# Patient Record
Sex: FEMALE | Race: ASIAN | NOT HISPANIC OR LATINO | URBAN - METROPOLITAN AREA
[De-identification: names, ages, dates, MRNs, and addresses within clinical notes are randomized per-mention and may not be internally consistent; named-entity substitution may affect disease eponyms.]

---

## 2020-12-03 ENCOUNTER — EMERGENCY (EMERGENCY)
Facility: HOSPITAL | Age: 50
LOS: 1 days | Discharge: HOME | End: 2020-12-03
Attending: STUDENT IN AN ORGANIZED HEALTH CARE EDUCATION/TRAINING PROGRAM | Admitting: STUDENT IN AN ORGANIZED HEALTH CARE EDUCATION/TRAINING PROGRAM
Payer: MEDICAID

## 2020-12-03 VITALS
RESPIRATION RATE: 16 BRPM | HEART RATE: 93 BPM | OXYGEN SATURATION: 100 % | TEMPERATURE: 99 F | DIASTOLIC BLOOD PRESSURE: 107 MMHG | SYSTOLIC BLOOD PRESSURE: 156 MMHG

## 2020-12-03 VITALS
DIASTOLIC BLOOD PRESSURE: 79 MMHG | OXYGEN SATURATION: 100 % | RESPIRATION RATE: 16 BRPM | HEART RATE: 71 BPM | SYSTOLIC BLOOD PRESSURE: 127 MMHG | TEMPERATURE: 98 F

## 2020-12-03 LAB
ALBUMIN SERPL ELPH-MCNC: 4.2 G/DL — SIGNIFICANT CHANGE UP (ref 3.3–5)
ALP SERPL-CCNC: 84 U/L — SIGNIFICANT CHANGE UP (ref 40–120)
ALT FLD-CCNC: 35 U/L — HIGH (ref 4–33)
ANION GAP SERPL CALC-SCNC: 10 MMO/L — SIGNIFICANT CHANGE UP (ref 7–14)
APTT BLD: 34.6 SEC — SIGNIFICANT CHANGE UP (ref 27–36.3)
AST SERPL-CCNC: 34 U/L — HIGH (ref 4–32)
BASOPHILS # BLD AUTO: 0.05 K/UL — SIGNIFICANT CHANGE UP (ref 0–0.2)
BASOPHILS NFR BLD AUTO: 0.5 % — SIGNIFICANT CHANGE UP (ref 0–2)
BILIRUB SERPL-MCNC: < 0.2 MG/DL — LOW (ref 0.2–1.2)
BUN SERPL-MCNC: 9 MG/DL — SIGNIFICANT CHANGE UP (ref 7–23)
CALCIUM SERPL-MCNC: 9.2 MG/DL — SIGNIFICANT CHANGE UP (ref 8.4–10.5)
CHLORIDE SERPL-SCNC: 102 MMOL/L — SIGNIFICANT CHANGE UP (ref 98–107)
CO2 SERPL-SCNC: 26 MMOL/L — SIGNIFICANT CHANGE UP (ref 22–31)
CREAT SERPL-MCNC: 0.6 MG/DL — SIGNIFICANT CHANGE UP (ref 0.5–1.3)
EOSINOPHIL # BLD AUTO: 0.29 K/UL — SIGNIFICANT CHANGE UP (ref 0–0.5)
EOSINOPHIL NFR BLD AUTO: 3.1 % — SIGNIFICANT CHANGE UP (ref 0–6)
GLUCOSE SERPL-MCNC: 107 MG/DL — HIGH (ref 70–99)
HCG SERPL-ACNC: < 5 MIU/ML — SIGNIFICANT CHANGE UP
HCT VFR BLD CALC: 37.8 % — SIGNIFICANT CHANGE UP (ref 34.5–45)
HGB BLD-MCNC: 11.9 G/DL — SIGNIFICANT CHANGE UP (ref 11.5–15.5)
IMM GRANULOCYTES NFR BLD AUTO: 0.4 % — SIGNIFICANT CHANGE UP (ref 0–1.5)
INR BLD: 0.95 — SIGNIFICANT CHANGE UP (ref 0.88–1.16)
LYMPHOCYTES # BLD AUTO: 3.92 K/UL — HIGH (ref 1–3.3)
LYMPHOCYTES # BLD AUTO: 41.3 % — SIGNIFICANT CHANGE UP (ref 13–44)
MCHC RBC-ENTMCNC: 24.9 PG — LOW (ref 27–34)
MCHC RBC-ENTMCNC: 31.5 % — LOW (ref 32–36)
MCV RBC AUTO: 79.2 FL — LOW (ref 80–100)
MONOCYTES # BLD AUTO: 0.88 K/UL — SIGNIFICANT CHANGE UP (ref 0–0.9)
MONOCYTES NFR BLD AUTO: 9.3 % — SIGNIFICANT CHANGE UP (ref 2–14)
NEUTROPHILS # BLD AUTO: 4.32 K/UL — SIGNIFICANT CHANGE UP (ref 1.8–7.4)
NEUTROPHILS NFR BLD AUTO: 45.4 % — SIGNIFICANT CHANGE UP (ref 43–77)
NRBC # FLD: 0 K/UL — SIGNIFICANT CHANGE UP (ref 0–0)
NT-PROBNP SERPL-SCNC: 19.55 PG/ML — SIGNIFICANT CHANGE UP
PLATELET # BLD AUTO: 284 K/UL — SIGNIFICANT CHANGE UP (ref 150–400)
PMV BLD: 11.3 FL — SIGNIFICANT CHANGE UP (ref 7–13)
POTASSIUM SERPL-MCNC: 3.4 MMOL/L — LOW (ref 3.5–5.3)
POTASSIUM SERPL-SCNC: 3.4 MMOL/L — LOW (ref 3.5–5.3)
PROT SERPL-MCNC: 8.3 G/DL — SIGNIFICANT CHANGE UP (ref 6–8.3)
PROTHROM AB SERPL-ACNC: 10.9 SEC — SIGNIFICANT CHANGE UP (ref 10.6–13.6)
RBC # BLD: 4.77 M/UL — SIGNIFICANT CHANGE UP (ref 3.8–5.2)
RBC # FLD: 15.2 % — HIGH (ref 10.3–14.5)
SODIUM SERPL-SCNC: 138 MMOL/L — SIGNIFICANT CHANGE UP (ref 135–145)
TROPONIN T, HIGH SENSITIVITY: < 6 NG/L — SIGNIFICANT CHANGE UP (ref ?–14)
WBC # BLD: 9.5 K/UL — SIGNIFICANT CHANGE UP (ref 3.8–10.5)
WBC # FLD AUTO: 9.5 K/UL — SIGNIFICANT CHANGE UP (ref 3.8–10.5)

## 2020-12-03 PROCEDURE — 71046 X-RAY EXAM CHEST 2 VIEWS: CPT | Mod: 26

## 2020-12-03 PROCEDURE — 93010 ELECTROCARDIOGRAM REPORT: CPT

## 2020-12-03 PROCEDURE — 99285 EMERGENCY DEPT VISIT HI MDM: CPT | Mod: 25

## 2020-12-03 PROCEDURE — 70450 CT HEAD/BRAIN W/O DYE: CPT | Mod: 26

## 2020-12-03 PROCEDURE — 71260 CT THORAX DX C+: CPT | Mod: 26

## 2020-12-03 RX ORDER — ACETAMINOPHEN 500 MG
650 TABLET ORAL ONCE
Refills: 0 | Status: COMPLETED | OUTPATIENT
Start: 2020-12-03 | End: 2020-12-03

## 2020-12-03 RX ADMIN — Medication 650 MILLIGRAM(S): at 18:09

## 2020-12-03 NOTE — ED PROVIDER NOTE - NSFOLLOWUPINSTRUCTIONS_ED_ALL_ED_FT
1) Please Follow up with PMD/Clinic within 2-3 days     2) Please take the antibiotic Zithromax as ordered for fevers     3) Please take Tylenol 650mg every 4-6 hours as needed for pain.    4) Please return to  Emergency Department for any new or worsening symptoms.

## 2020-12-03 NOTE — ED ADULT NURSE NOTE - OBJECTIVE STATEMENT
Pt primarily Lao speaking.  services used [employer] [id number]. Pt presents to the ED a&ox4 and ambulatory c/o of right and left breast pain and nipple discharge x1 month, worse in the last 2 days. Pt states pain radiates to the back and neck. Pt endorsing dizziness and nausea with no vomiting. Pt endorsing having a fever earlier this week , pt currently afebrile. Pt denying cough, SOB, vomiting, diarrhea, dysuria, hematuria, recent travel, sick contacts. Pt presents hypertensive with no HTN hx. Md Garner aware of BP. Pt NSR on the monitor, breathing even and unlabored, sao2 100% on RA. 20g placed to the right AC, line clear and patent, labs drawn and sent. Pt urine hcg negative. Skin clean dry and intact. Awaiting further orders. Will continue to monitor.

## 2020-12-03 NOTE — ED PROVIDER NOTE - PATIENT PORTAL LINK FT
You can access the FollowMyHealth Patient Portal offered by [facility] by registering at the following website: [email or web address]. By joining [employer]’s FollowMyHealth portal, you will also be able to view your health information using other applications (apps) compatible with our system.

## 2020-12-03 NOTE — ED PROVIDER NOTE - PHYSICAL EXAMINATION
CONSTITUTIONAL: NAD, awake, alert  HEAD: Normocephalic; atraumatic  EYES: EOMI, no nystagmus, temporal fields intact   ENMT: External appears normal, MMM  CARD: Normal Sl, S2; no audible murmurs  BREAST: RN at bedside, no discharge, bilateral tenderness over nipple, no erythema   RESP: normal wob, lungs ctab  ABD: soft, non-distended; non-tender  MSK: no edema, normal ROM in all four extremities  SKIN: Warm, dry, no rashes  NEURO: aaox3, moving all extremities spontaneously

## 2020-12-03 NOTE — ED PROVIDER NOTE - NS ED ROS FT
General: + fever  HENT: denies nasal congestion, rhinorrhea  Eyes: denies visual changes, blurred vision  CV: denies chest pain, palpitations  Breast: + bilateral breast pain, + discharge   Resp: denies difficulty breathing, cough  Abdominal: denies nausea, vomiting, abdominal pain  MSK: denies muscle aches, leg swelling  Neuro: + headaches, denies numbness, tingling  Skin: denies rashes, bruises

## 2020-12-03 NOTE — ED PROVIDER NOTE - OBJECTIVE STATEMENT
50F w/ h/o pre diabetes, no meds, LMP 2 years ago, presents w/ 1 month of bilateral breast pain, headache, and intermittent fevers. States she does not have any chest pain. Pain is non exertional. States she has noted mild discharge from both nipples

## 2020-12-03 NOTE — ED ADULT NURSE NOTE - CHIEF COMPLAINT QUOTE
C/o CP x1 month, Cp worse x2 days. Also c/o SOB dizziness and nausea today. Speaks Korean.  Aris ID [id number]

## 2020-12-03 NOTE — ED PROVIDER NOTE - CLINICAL SUMMARY MEDICAL DECISION MAKING FREE TEXT BOX
50F w/ bilateral breast pain, + TTP, no erythema, low likelihood of bilateral abscesses but will check ct, ct head to check for large mass, likely needs MRI for prolactinoma, patient neuro intact otherwise, no chest pain or exertional component, low suspicion acs

## 2020-12-03 NOTE — ED ADULT TRIAGE NOTE - CHIEF COMPLAINT QUOTE
C/o CP x1 month, Cp worse x2 days. Also c/o SOB dizziness and nausea today. Speaks Frisian.  Aris ID [id number]

## 2020-12-03 NOTE — ED PROVIDER NOTE - ATTENDING CONTRIBUTION TO CARE
50F w/ PMH DM, HLD p/w chest pain and bilateral breast pain. pt states that she has been having pain in bilateral breast for the last 4-5 weeks. She reports its a burning pain across her chest w/o radiation. She states she has also had some mild discharge from both breasts and that her breasts are , + TTP, no erythema,. She states she had low grade fevers. LMP was 2 years ago. She has also been taking tylenol for the pain pain is currently 8/10   denies fever, chills, +chest pain, SOB, abdominal pain, diarrhea, dysuria, syncope, bleeding, new rash,weakness, numbness, blurred vision  +nipple discharge   ROS  otherwise negative as per HPI  Gen: Awake, Alert, WD, WN, NAD  Head:  NC/AT  Eyes:  PERRL, EOMI, Conjunctiva pink, lids normal, no scleral icterus  ENT: moist mucus membranes  Neck: supple, nontender, no meningismus, no JVD, trachea midline  Cardiac/CV:  S1 S2, RRR, no M/G/R  Chest: breath tenderness w/ ? fluctuance medial aspect of left breast, no nipple discharge, chaparone RN   Respiratory/Pulm:  CTAB, good air movement, normal resp effort, no wheezes/stridor/retractions/rales/rhonchi  Gastrointestinal/Abdomen:  Soft, nontender, nondistended, +BS, no rebound/guarding  Ext:  warm, well perfused, moving all extremities spontaneously, no peripheral edema, distal pulses intact  Skin: intact, no rash  Neuro:  AAOx3, sensation intact, motor 5/5 x 4 extremities, normal gait, speech clear  MDM as above

## 2020-12-04 ENCOUNTER — EMERGENCY (EMERGENCY)
Facility: HOSPITAL | Age: 50
LOS: 1 days | Discharge: HOME | End: 2020-12-04
Attending: EMERGENCY MEDICINE | Admitting: EMERGENCY MEDICINE
Payer: MEDICAID

## 2020-12-04 VITALS
DIASTOLIC BLOOD PRESSURE: 86 MMHG | TEMPERATURE: 99 F | RESPIRATION RATE: 20 BRPM | HEART RATE: 99 BPM | SYSTOLIC BLOOD PRESSURE: 132 MMHG | OXYGEN SATURATION: 100 %

## 2020-12-04 LAB — PROLACTIN SERPL-MCNC: 9.6 NG/ML — SIGNIFICANT CHANGE UP (ref 3.4–24.1)

## 2020-12-04 PROCEDURE — 93010 ELECTROCARDIOGRAM REPORT: CPT

## 2020-12-04 PROCEDURE — 71046 X-RAY EXAM CHEST 2 VIEWS: CPT | Mod: 26

## 2020-12-04 PROCEDURE — 99284 EMERGENCY DEPT VISIT MOD MDM: CPT | Mod: 25

## 2020-12-04 RX ORDER — DIPHENHYDRAMINE HCL 50 MG
25 CAPSULE ORAL ONCE
Refills: 0 | Status: COMPLETED | OUTPATIENT
Start: 2020-12-04 | End: 2020-12-04

## 2020-12-04 RX ORDER — AZITHROMYCIN 500 MG/1
1 TABLET, FILM COATED ORAL
Qty: 1 | Refills: 0
Start: 2020-12-04 | End: 2020-12-06

## 2020-12-04 RX ADMIN — Medication 25 MILLIGRAM(S): at 10:31

## 2020-12-04 RX ADMIN — Medication 50 MILLIGRAM(S): at 10:31

## 2020-12-04 NOTE — ED PROVIDER NOTE - NS ED ROS FT
CONST: no fevers, no chills  EYES: no pain, no vision changes  ENT: no sore throat, no ear pain, no change in hearing  CV: no chest pain, no leg swelling  RESP: no shortness of breath, no cough  ABD: no abdominal pain, no nausea, no vomiting, no diarrhea  : no dysuria, no flank pain, no hematuria  MSK: no back pain, no extremity pain  NEURO: no headache or additional neurologic complaints  HEME: no easy bleeding  SKIN:  + rash, + itching CONST: no fevers, no chills  EYES: no pain, no vision changes  ENT: no sore throat, no ear pain, no change in hearing  CV: no chest pain, no leg swelling  RESP: no shortness of breath, no cough  ABD: no abdominal pain, no nausea, no vomiting, no diarrhea  : no dysuria, no flank pain, no hematuria  MSK: no back pain, no extremity pain  NEURO: no headache or additional neurologic complaints  HEME: no easy bleeding  SKIN:  + rash, + itching  Breast- watery discharge

## 2020-12-04 NOTE — ED ADULT NURSE NOTE - CHIEF COMPLAINT QUOTE
[phone] # son on the phone states my mom has rash with itching since yesterday, she was seen in ER with chest pain and head ache and was given some meds and when she went home she started to itch with rash and body pain .son endorses to subjective fever , patient sent home with script for Zithromax which they never picked it from Pharmacy. son Eliu Vidal

## 2020-12-04 NOTE — ED PROVIDER NOTE - PHYSICAL EXAMINATION
GENERAL: Awake, alert, NAD  HEENT: NC/AT, moist mucous membranes, PERRL, EOMI  LUNGS: CTAB, no wheezes or crackles   BREAST: No discharge, mildly tender to palpation bilaterally.   CARDIAC: RRR, no m/r/g  ABDOMEN: Soft, normal BS, non tender, non distended, no rebound, no guarding  BACK: No midline spinal tenderness, no CVA tenderness  EXT: No edema, no calf tenderness, 2+ DP pulses bilaterally, no deformities.  NEURO: A&Ox3. Moving all extremities.  SKIN: Warm and dry. Excoriations across arms, abdomen, back. No hives.   PSYCH: Normal affect. GENERAL: Awake, alert, NAD  HEENT: NC/AT, moist mucous membranes, PERRL, EOMI  LUNGS: CTAB, no wheezes or crackles   BREAST: No discharge, mildly tender to palpation bilaterally.   CARDIAC: RRR, no m/r/g  ABDOMEN: Soft, normal BS, non tender, non distended, no rebound, no guarding  BACK: No midline spinal tenderness, no CVA tenderness  EXT: No edema, no calf tenderness, 2+ DP pulses bilaterally, no deformities.  NEURO: A&Ox3. Moving all extremities.  SKIN: Warm and dry. Excoriations across arms, abdomen, back.  hives.   PSYCH: Normal affect.

## 2020-12-04 NOTE — ED PROVIDER NOTE - PATIENT PORTAL LINK FT
You can access the FollowMyHealth Patient Portal offered by [facility] by registering at the following website: [email or web address]. By joining [employer]’s FollowMyHealth portal, you will also be able to view your health information using other applications (apps) compatible with our system.

## 2020-12-04 NOTE — ED PROVIDER NOTE - ATTENDING CONTRIBUTION TO CARE
I performed a face to face evaluation of this patient and performed a full history and physical examination on the patient.  I agree with the resident's history, physical examination, and plan of the patient.  Pt with breast discharge/pain, but no discharge on exam, no masses felt (chaperone with resident [place]), lungs cta, heart wnl abdomen soft nontender, ext wnl, neuro wnl skin with diffuse maculopapular blanchable rash c/w allergic reaction    Pt with rash likley secondary to the Azithomax, told not to take the medication and now is allergic to it, no evidence of infection, discharged on steroids and benadryl and followup with pmd

## 2020-12-04 NOTE — ED PROVIDER NOTE - CLINICAL SUMMARY MEDICAL DECISION MAKING FREE TEXT BOX
50 year old F with allergic reaction s/p azithromycin. Excoriations across body, no wheezing or SOB. Normal head and chest CT in ED yesterday. Will give benadryl, prednisone. Reassess. Follow up with PCP.

## 2020-12-04 NOTE — ED ADULT TRIAGE NOTE - CHIEF COMPLAINT QUOTE
son on the phone states my mom has rash with itching since yesterday, she was seen in ER with chest pain and head ache and was given some meds and when she went home she started to itch with rash and body pain .son endorses to subjective fever [phone] # son on the phone states my mom has rash with itching since yesterday, she was seen in ER with chest pain and head ache and was given some meds and when she went home she started to itch with rash and body pain .son endorses to subjective fever , patient sent home with script for Zithromax which they never picked it from Pharmacy. son Eliu Vidal

## 2020-12-04 NOTE — ED PROVIDER NOTE - NSFOLLOWUPINSTRUCTIONS_ED_ALL_ED_FT
You were evaluated in the emergency department for an allergic reaction. Your symptoms improved with medication.     Please follow up with your primary care doctor. Use caution when taking any new antibiotics.     Please return to the emergency department with any new or worsening symptoms, including:  -Severe itching and hives  -Shortness of breath  -Sensation of throat closing  -Chest pain  -Abdominal pain  -High fevers You were evaluated in the emergency department for an allergic reaction. Your symptoms improved with medication.     Please follow up with your primary care doctor. Use caution when taking any new antibiotics.     You can take Benadryl over the counter, one pill every 8 hours as needed for the next 2 days. A prescription for Prednisone has been sent to your pharmacy.     Please return to the emergency department with any new or worsening symptoms, including:  -Severe itching and hives  -Shortness of breath  -Sensation of throat closing  -Chest pain  -Abdominal pain  -High fevers

## 2020-12-04 NOTE — ED PROVIDER NOTE - OBJECTIVE STATEMENT
50 year old F with PMH prediabetes presenting with diffuse itching. Patient states she was seen in ED last night for bilateral breast pain and watery discharge x1 month. Was given azithromycin and discharged with prescription. Beginning 4am began to experience diffuse itching throughout her body. Called ED and was told it was probably a reaction to antibiotic, told to DC antibiotic. Took two "allergy pills" and used some lotion with no relief. States she had a similar allergic reaction 4 years ago but cannot recall to what. Otherwise no known allergies. Denies difficulty swallowing, shortness of breath, CP, fevers, N/V/D. Follows with PCP Dr. Mimi Prakash, whom she had seen previously for complaints of breast pain, treated with Tylenol and ibuprofen. No prior mammograms. History obtained from son and  #[id number].

## 2020-12-04 NOTE — ED PROVIDER NOTE - PROGRESS NOTE DETAILS
Kristin Sinha PGY-1:  Pt was re-evaluated at bedside, VSS, feeling better overall. Results were discussed with patient as well as return precautions and follow up plan with PCP and/or specialist. Time was taken to answer any questions that the patient had before providing them with discharge paperwork. Explained discharging without official XR read, patient and son agreeable. pt's physician Mimi Uptal . pt's physician Mimi Uptal .- tried to call but unable to reach, family knows to followup with him- left message

## 2021-08-22 ENCOUNTER — EMERGENCY (EMERGENCY)
Facility: HOSPITAL | Age: 51
LOS: 1 days | Discharge: HOME | End: 2021-08-22
Admitting: EMERGENCY MEDICINE
Payer: MEDICAID

## 2021-08-22 VITALS
TEMPERATURE: 98 F | SYSTOLIC BLOOD PRESSURE: 123 MMHG | DIASTOLIC BLOOD PRESSURE: 65 MMHG | RESPIRATION RATE: 16 BRPM | OXYGEN SATURATION: 100 % | HEART RATE: 81 BPM

## 2021-08-22 VITALS
SYSTOLIC BLOOD PRESSURE: 117 MMHG | TEMPERATURE: 98 F | RESPIRATION RATE: 16 BRPM | DIASTOLIC BLOOD PRESSURE: 68 MMHG | OXYGEN SATURATION: 99 % | HEART RATE: 86 BPM

## 2021-08-22 LAB
ALBUMIN SERPL ELPH-MCNC: 4 G/DL — SIGNIFICANT CHANGE UP (ref 3.3–5)
ALP SERPL-CCNC: 77 U/L — SIGNIFICANT CHANGE UP (ref 40–120)
ALT FLD-CCNC: 32 U/L — SIGNIFICANT CHANGE UP (ref 4–33)
ANION GAP SERPL CALC-SCNC: 15 MMOL/L — HIGH (ref 7–14)
AST SERPL-CCNC: 30 U/L — SIGNIFICANT CHANGE UP (ref 4–32)
BASOPHILS # BLD AUTO: 0.07 K/UL — SIGNIFICANT CHANGE UP (ref 0–0.2)
BASOPHILS NFR BLD AUTO: 0.9 % — SIGNIFICANT CHANGE UP (ref 0–2)
BILIRUB SERPL-MCNC: 0.2 MG/DL — SIGNIFICANT CHANGE UP (ref 0.2–1.2)
BUN SERPL-MCNC: 12 MG/DL — SIGNIFICANT CHANGE UP (ref 7–23)
CALCIUM SERPL-MCNC: 9.6 MG/DL — SIGNIFICANT CHANGE UP (ref 8.4–10.5)
CHLORIDE SERPL-SCNC: 102 MMOL/L — SIGNIFICANT CHANGE UP (ref 98–107)
CO2 SERPL-SCNC: 21 MMOL/L — LOW (ref 22–31)
CREAT SERPL-MCNC: 0.74 MG/DL — SIGNIFICANT CHANGE UP (ref 0.5–1.3)
EOSINOPHIL # BLD AUTO: 0.32 K/UL — SIGNIFICANT CHANGE UP (ref 0–0.5)
EOSINOPHIL NFR BLD AUTO: 3.9 % — SIGNIFICANT CHANGE UP (ref 0–6)
GLUCOSE SERPL-MCNC: 132 MG/DL — HIGH (ref 70–99)
HCT VFR BLD CALC: 38.9 % — SIGNIFICANT CHANGE UP (ref 34.5–45)
HGB BLD-MCNC: 12.4 G/DL — SIGNIFICANT CHANGE UP (ref 11.5–15.5)
IANC: 3.49 K/UL — SIGNIFICANT CHANGE UP (ref 1.5–8.5)
IMM GRANULOCYTES NFR BLD AUTO: 0.2 % — SIGNIFICANT CHANGE UP (ref 0–1.5)
LYMPHOCYTES # BLD AUTO: 3.66 K/UL — HIGH (ref 1–3.3)
LYMPHOCYTES # BLD AUTO: 44.6 % — HIGH (ref 13–44)
MCHC RBC-ENTMCNC: 24.7 PG — LOW (ref 27–34)
MCHC RBC-ENTMCNC: 31.9 GM/DL — LOW (ref 32–36)
MCV RBC AUTO: 77.5 FL — LOW (ref 80–100)
MONOCYTES # BLD AUTO: 0.64 K/UL — SIGNIFICANT CHANGE UP (ref 0–0.9)
MONOCYTES NFR BLD AUTO: 7.8 % — SIGNIFICANT CHANGE UP (ref 2–14)
NEUTROPHILS # BLD AUTO: 3.49 K/UL — SIGNIFICANT CHANGE UP (ref 1.8–7.4)
NEUTROPHILS NFR BLD AUTO: 42.6 % — LOW (ref 43–77)
NRBC # BLD: 0 /100 WBCS — SIGNIFICANT CHANGE UP
NRBC # FLD: 0 K/UL — SIGNIFICANT CHANGE UP
PLATELET # BLD AUTO: 372 K/UL — SIGNIFICANT CHANGE UP (ref 150–400)
POTASSIUM SERPL-MCNC: 4 MMOL/L — SIGNIFICANT CHANGE UP (ref 3.5–5.3)
POTASSIUM SERPL-SCNC: 4 MMOL/L — SIGNIFICANT CHANGE UP (ref 3.5–5.3)
PROT SERPL-MCNC: 8.1 G/DL — SIGNIFICANT CHANGE UP (ref 6–8.3)
RBC # BLD: 5.02 M/UL — SIGNIFICANT CHANGE UP (ref 3.8–5.2)
RBC # FLD: 14.3 % — SIGNIFICANT CHANGE UP (ref 10.3–14.5)
SODIUM SERPL-SCNC: 138 MMOL/L — SIGNIFICANT CHANGE UP (ref 135–145)
WBC # BLD: 8.2 K/UL — SIGNIFICANT CHANGE UP (ref 3.8–10.5)
WBC # FLD AUTO: 8.2 K/UL — SIGNIFICANT CHANGE UP (ref 3.8–10.5)

## 2021-08-22 PROCEDURE — 99284 EMERGENCY DEPT VISIT MOD MDM: CPT

## 2021-08-22 RX ORDER — KETOROLAC TROMETHAMINE 30 MG/ML
30 SYRINGE (ML) INJECTION ONCE
Refills: 0 | Status: DISCONTINUED | OUTPATIENT
Start: 2021-08-22 | End: 2021-08-22

## 2021-08-22 RX ADMIN — Medication 30 MILLIGRAM(S): at 11:37

## 2021-08-22 RX ADMIN — Medication 30 MILLIGRAM(S): at 12:22

## 2021-08-22 NOTE — ED PROVIDER NOTE - EXTREMITY EXAM
no swelling bilaterally, ambulating with steady gait, pulses palpable, good capillary refill./no deformity, pain or tenderness, no restriction of movement

## 2021-08-22 NOTE — ED PROVIDER NOTE - PATIENT PORTAL LINK FT
You can access the FollowMyHealth Patient Portal offered by [facility] by registering at the following website: [email or web address]. By joining [employer]s FollowMyHealth portal, you will also be able to view your health information using other applications (apps) compatible with our system.

## 2021-08-22 NOTE — ED PROVIDER NOTE - NSFOLLOWUPINSTRUCTIONS_ED_ALL_ED_FT
Rest, drink plenty of fluids.  Advance activity as tolerated.  Continue all previously prescribed medications as directed.  Follow up with your primary care physician in 48-72 hours- bring copies of your results.  Return to the ER for worsening or persistent symptoms, and/or ANY NEW OR CONCERNING SYMPTOMS. If you have issues obtaining follow up, please call: [phone] to obtain a doctor or specialist who takes your insurance in your area.  You may call [phone] to make an appointment with the internal medicine clinic.

## 2021-08-22 NOTE — ED PROVIDER NOTE - CLINICAL SUMMARY MEDICAL DECISION MAKING FREE TEXT BOX
this is a 51 y.o with a PMHx of DM not on any medication, presented to the ED complaining of having bilateral hand and foot pain for the past several months, Hand and foot pain-labs r/o electrolyte abnormality,

## 2021-08-22 NOTE — ED ADULT TRIAGE NOTE - CHIEF COMPLAINT QUOTE
translater  [id number]  Babu/ pt c/o pain to rt hand and both lower extremity/ describes as burning feeling for past month  deneis DM or Htn /  pt is allergic to Iv contrast

## 2022-02-21 ENCOUNTER — EMERGENCY (EMERGENCY)
Facility: HOSPITAL | Age: 52
LOS: 1 days | Discharge: HOME | End: 2022-02-21
Attending: EMERGENCY MEDICINE | Admitting: EMERGENCY MEDICINE
Payer: MEDICAID

## 2022-02-21 VITALS
DIASTOLIC BLOOD PRESSURE: 101 MMHG | SYSTOLIC BLOOD PRESSURE: 157 MMHG | RESPIRATION RATE: 17 BRPM | OXYGEN SATURATION: 99 % | HEART RATE: 81 BPM | TEMPERATURE: 98 F

## 2022-02-21 VITALS
SYSTOLIC BLOOD PRESSURE: 158 MMHG | RESPIRATION RATE: 18 BRPM | OXYGEN SATURATION: 100 % | DIASTOLIC BLOOD PRESSURE: 86 MMHG | HEART RATE: 90 BPM

## 2022-02-21 DIAGNOSIS — Z90.89 ACQUIRED ABSENCE OF OTHER ORGANS: Chronic | ICD-10-CM

## 2022-02-21 PROCEDURE — 93010 ELECTROCARDIOGRAM REPORT: CPT

## 2022-02-21 PROCEDURE — 99284 EMERGENCY DEPT VISIT MOD MDM: CPT | Mod: 25

## 2022-02-21 RX ORDER — LIDOCAINE 4 G/100G
1 CREAM TOPICAL ONCE
Refills: 0 | Status: COMPLETED | OUTPATIENT
Start: 2022-02-21 | End: 2022-02-21

## 2022-02-21 RX ORDER — ACETAMINOPHEN 500 MG
650 TABLET ORAL ONCE
Refills: 0 | Status: COMPLETED | OUTPATIENT
Start: 2022-02-21 | End: 2022-02-21

## 2022-02-21 RX ADMIN — Medication 650 MILLIGRAM(S): at 15:17

## 2022-02-21 RX ADMIN — LIDOCAINE 1 PATCH: 4 CREAM TOPICAL at 15:18

## 2022-02-21 NOTE — ED PROVIDER NOTE - ATTENDING CONTRIBUTION TO CARE
HPI: 52 y/o female with a hx of DM, HTN(not on Meds), Depression presents to the ER c/o 1+ year of left sided neck/shoulder pain worse the past few months, pt reports intermittent tingling/numbness.  Pt has seen and nerve specialist and Orthopedics for the same complaint.  Pt reports relief at home with Motrin.  Pt denies chest pain, shortness of breath, weakness, nausea, vomiting, abdominal pain, fevers, chills, injury.   EXAM: NAD, speaking full sentences, FROM at neck but with pain with rotation, n\ttp bilateral trapezius R>L. No stepoffs or midline tenderness to palpation C/T/L spine, gait steady and stable, no need for assistance.   MDM: pt with multiple medical problems that has had pain in neck/back x 1 year, here for pain meds since pain worsening in last few months, likely cervical radiculopathy. Explained that this is likely MSK pain and not stroke or other acute emergent pathology. Pt and son aware. Advised to f/u with PMD for PT eval and recs vs stretching in a safe and comfortable environment at home to relief long term neck pain/muscle strain but in meantime can take OTC pain meds for relief or use Icy hot or other menthol ointments.

## 2022-02-21 NOTE — ED PROVIDER NOTE - NSFOLLOWUPINSTRUCTIONS_ED_ALL_ED_FT
Follow up with your Doctor in 1-2 days.    Follow up with Orthopedics in 1-2 days see attached list.  Heat to back.    Take Tylenol 650mg orally every 6 hours as needed for pain.  Return to the ER for any persistent/worsening or new symptoms, weakness, numbness, tingling, chest pain, shortness of breath, or any concerning symptoms. none

## 2022-02-21 NOTE — ED PROVIDER NOTE - PHYSICAL EXAMINATION
+TTP left side paraspinal cervical region, FROM, strength 5/5, sensation equal and intact, mild TTP at anterior aspect of shoulder with FROM. 2+ pulses.

## 2022-02-21 NOTE — ED ADULT TRIAGE NOTE - CHIEF COMPLAINT QUOTE
Pt c/o hypertension, left sided neck and head pain starting 3 days ago. Pt not currently prescribed blood pressure medication

## 2022-02-21 NOTE — ED PROVIDER NOTE - PATIENT PORTAL LINK FT
You can access the FollowMyHealth Patient Portal offered by [facility] by registering at the following website: [email or web address]. By joining [employer]’s FollowMyHealth portal, you will also be able to view your health information using other applications (apps) compatible with our system.

## 2022-02-21 NOTE — ED PROVIDER NOTE - CLINICAL SUMMARY MEDICAL DECISION MAKING FREE TEXT BOX
52 y/o female with a hx of DM, HTN(not on Meds), Depression presents to the ER c/o 1+ year of left sided neck/shoulder pain worse the past few months, pt reports intermittent tingling/numbness.  Pt has seen and nerve specialist and Orthopedics for the same complaint.  Pt is well appearing, NAD, normal neuro exam, pain control, follow up PMD/Orthopedics.

## 2022-02-21 NOTE — ED PROVIDER NOTE - PROGRESS NOTE DETAILS
ELAYNE Guillen: pt feels better ambulating without difficulty.  Discharge reviewed and discussed with patient.  All questions answered conducted via  ID# [id number].  Pt's son at bedside.

## 2022-02-21 NOTE — ED PROVIDER NOTE - OBJECTIVE STATEMENT
50 y/o female with a hx of DM, HTN(not on Meds), Depression presents to the ER c/o 1+ year of left sided neck/shoulder pain worse the past few months, pt reports intermittent tingling/numbness.  Pt has seen and nerve specialist and Orthopedics for the same complaint.  Pt reports relief at home with Motrin.  Pt denies chest pain, shortness of breath, weakness, nausea, vomiting, abdominal pain, fevers, chills, injury.  H&P obtained via  ID# [id number].

## 2022-02-21 NOTE — ED ADULT NURSE NOTE - OBJECTIVE STATEMENT
Break RN note- patient arrives to the ED for left arm and neck pain for the past 6-12 months. Patient reports it is worse today. A&Ox4. Son at bedside. Patient reports she has seen her PCP and a neurologist for this issue before. Patient unable to clearly communicate what the outcome was. Patient as full range of motion. Denies any headache, dizziness, SOB, or chest pain. Patient medicated as ordered. Safety maintained. Patient stable upon exiting the room.

## 2023-01-13 ENCOUNTER — EMERGENCY (EMERGENCY)
Facility: HOSPITAL | Age: 53
LOS: 1 days | Discharge: HOME | End: 2023-01-13
Attending: EMERGENCY MEDICINE | Admitting: EMERGENCY MEDICINE
Payer: MEDICAID

## 2023-01-13 VITALS
SYSTOLIC BLOOD PRESSURE: 150 MMHG | TEMPERATURE: 98 F | RESPIRATION RATE: 16 BRPM | DIASTOLIC BLOOD PRESSURE: 82 MMHG | OXYGEN SATURATION: 100 % | HEART RATE: 98 BPM

## 2023-01-13 VITALS
TEMPERATURE: 98 F | RESPIRATION RATE: 16 BRPM | DIASTOLIC BLOOD PRESSURE: 88 MMHG | OXYGEN SATURATION: 100 % | HEART RATE: 98 BPM | SYSTOLIC BLOOD PRESSURE: 147 MMHG

## 2023-01-13 DIAGNOSIS — Z90.89 ACQUIRED ABSENCE OF OTHER ORGANS: Chronic | ICD-10-CM

## 2023-01-13 PROBLEM — I10 ESSENTIAL (PRIMARY) HYPERTENSION: Chronic | Status: ACTIVE | Noted: 2022-02-21

## 2023-01-13 PROBLEM — E11.9 TYPE 2 DIABETES MELLITUS WITHOUT COMPLICATIONS: Chronic | Status: ACTIVE | Noted: 2022-02-21

## 2023-01-13 LAB
ALBUMIN SERPL ELPH-MCNC: 3.8 G/DL — SIGNIFICANT CHANGE UP (ref 3.3–5)
ALP SERPL-CCNC: 91 U/L — SIGNIFICANT CHANGE UP (ref 40–120)
ALT FLD-CCNC: 25 U/L — SIGNIFICANT CHANGE UP (ref 4–33)
ANION GAP SERPL CALC-SCNC: 11 MMOL/L — SIGNIFICANT CHANGE UP (ref 7–14)
AST SERPL-CCNC: 25 U/L — SIGNIFICANT CHANGE UP (ref 4–32)
B PERT DNA SPEC QL NAA+PROBE: SIGNIFICANT CHANGE UP
B PERT+PARAPERT DNA PNL SPEC NAA+PROBE: SIGNIFICANT CHANGE UP
BASOPHILS # BLD AUTO: 0.07 K/UL — SIGNIFICANT CHANGE UP (ref 0–0.2)
BASOPHILS NFR BLD AUTO: 0.9 % — SIGNIFICANT CHANGE UP (ref 0–2)
BILIRUB SERPL-MCNC: 0.2 MG/DL — SIGNIFICANT CHANGE UP (ref 0.2–1.2)
BORDETELLA PARAPERTUSSIS (RAPRVP): SIGNIFICANT CHANGE UP
BUN SERPL-MCNC: 12 MG/DL — SIGNIFICANT CHANGE UP (ref 7–23)
C PNEUM DNA SPEC QL NAA+PROBE: SIGNIFICANT CHANGE UP
CALCIUM SERPL-MCNC: 8.8 MG/DL — SIGNIFICANT CHANGE UP (ref 8.4–10.5)
CHLORIDE SERPL-SCNC: 103 MMOL/L — SIGNIFICANT CHANGE UP (ref 98–107)
CO2 SERPL-SCNC: 23 MMOL/L — SIGNIFICANT CHANGE UP (ref 22–31)
CREAT SERPL-MCNC: 0.6 MG/DL — SIGNIFICANT CHANGE UP (ref 0.5–1.3)
EGFR: 109 ML/MIN/1.73M2 — SIGNIFICANT CHANGE UP
EOSINOPHIL # BLD AUTO: 0.34 K/UL — SIGNIFICANT CHANGE UP (ref 0–0.5)
EOSINOPHIL NFR BLD AUTO: 4.2 % — SIGNIFICANT CHANGE UP (ref 0–6)
FLUAV SUBTYP SPEC NAA+PROBE: SIGNIFICANT CHANGE UP
FLUBV RNA SPEC QL NAA+PROBE: SIGNIFICANT CHANGE UP
GLUCOSE SERPL-MCNC: 163 MG/DL — HIGH (ref 70–99)
HADV DNA SPEC QL NAA+PROBE: SIGNIFICANT CHANGE UP
HCOV 229E RNA SPEC QL NAA+PROBE: SIGNIFICANT CHANGE UP
HCOV HKU1 RNA SPEC QL NAA+PROBE: SIGNIFICANT CHANGE UP
HCOV NL63 RNA SPEC QL NAA+PROBE: SIGNIFICANT CHANGE UP
HCOV OC43 RNA SPEC QL NAA+PROBE: SIGNIFICANT CHANGE UP
HCT VFR BLD CALC: 38.5 % — SIGNIFICANT CHANGE UP (ref 34.5–45)
HGB BLD-MCNC: 11.8 G/DL — SIGNIFICANT CHANGE UP (ref 11.5–15.5)
HMPV RNA SPEC QL NAA+PROBE: SIGNIFICANT CHANGE UP
HPIV1 RNA SPEC QL NAA+PROBE: SIGNIFICANT CHANGE UP
HPIV2 RNA SPEC QL NAA+PROBE: SIGNIFICANT CHANGE UP
HPIV3 RNA SPEC QL NAA+PROBE: SIGNIFICANT CHANGE UP
HPIV4 RNA SPEC QL NAA+PROBE: SIGNIFICANT CHANGE UP
IANC: 3.93 K/UL — SIGNIFICANT CHANGE UP (ref 1.8–7.4)
IMM GRANULOCYTES NFR BLD AUTO: 0.4 % — SIGNIFICANT CHANGE UP (ref 0–0.9)
LYMPHOCYTES # BLD AUTO: 3.09 K/UL — SIGNIFICANT CHANGE UP (ref 1–3.3)
LYMPHOCYTES # BLD AUTO: 38.5 % — SIGNIFICANT CHANGE UP (ref 13–44)
M PNEUMO DNA SPEC QL NAA+PROBE: SIGNIFICANT CHANGE UP
MCHC RBC-ENTMCNC: 23.3 PG — LOW (ref 27–34)
MCHC RBC-ENTMCNC: 30.6 GM/DL — LOW (ref 32–36)
MCV RBC AUTO: 76.1 FL — LOW (ref 80–100)
MONOCYTES # BLD AUTO: 0.56 K/UL — SIGNIFICANT CHANGE UP (ref 0–0.9)
MONOCYTES NFR BLD AUTO: 7 % — SIGNIFICANT CHANGE UP (ref 2–14)
NEUTROPHILS # BLD AUTO: 3.93 K/UL — SIGNIFICANT CHANGE UP (ref 1.8–7.4)
NEUTROPHILS NFR BLD AUTO: 49 % — SIGNIFICANT CHANGE UP (ref 43–77)
NRBC # BLD: 0 /100 WBCS — SIGNIFICANT CHANGE UP (ref 0–0)
NRBC # FLD: 0 K/UL — SIGNIFICANT CHANGE UP (ref 0–0)
PLATELET # BLD AUTO: 359 K/UL — SIGNIFICANT CHANGE UP (ref 150–400)
POTASSIUM SERPL-MCNC: 3.7 MMOL/L — SIGNIFICANT CHANGE UP (ref 3.5–5.3)
POTASSIUM SERPL-SCNC: 3.7 MMOL/L — SIGNIFICANT CHANGE UP (ref 3.5–5.3)
PROT SERPL-MCNC: 7.7 G/DL — SIGNIFICANT CHANGE UP (ref 6–8.3)
RAPID RVP RESULT: SIGNIFICANT CHANGE UP
RBC # BLD: 5.06 M/UL — SIGNIFICANT CHANGE UP (ref 3.8–5.2)
RBC # FLD: 16.2 % — HIGH (ref 10.3–14.5)
RSV RNA SPEC QL NAA+PROBE: SIGNIFICANT CHANGE UP
RV+EV RNA SPEC QL NAA+PROBE: SIGNIFICANT CHANGE UP
SARS-COV-2 RNA SPEC QL NAA+PROBE: SIGNIFICANT CHANGE UP
SODIUM SERPL-SCNC: 137 MMOL/L — SIGNIFICANT CHANGE UP (ref 135–145)
WBC # BLD: 8.02 K/UL — SIGNIFICANT CHANGE UP (ref 3.8–10.5)
WBC # FLD AUTO: 8.02 K/UL — SIGNIFICANT CHANGE UP (ref 3.8–10.5)

## 2023-01-13 PROCEDURE — 71046 X-RAY EXAM CHEST 2 VIEWS: CPT | Mod: 26

## 2023-01-13 PROCEDURE — 99284 EMERGENCY DEPT VISIT MOD MDM: CPT

## 2023-01-13 RX ORDER — METOCLOPRAMIDE HCL 10 MG
10 TABLET ORAL ONCE
Refills: 0 | Status: COMPLETED | OUTPATIENT
Start: 2023-01-13 | End: 2023-01-13

## 2023-01-13 RX ORDER — SODIUM CHLORIDE 9 MG/ML
1000 INJECTION INTRAMUSCULAR; INTRAVENOUS; SUBCUTANEOUS ONCE
Refills: 0 | Status: COMPLETED | OUTPATIENT
Start: 2023-01-13 | End: 2023-01-13

## 2023-01-13 RX ORDER — KETOROLAC TROMETHAMINE 30 MG/ML
15 SYRINGE (ML) INJECTION ONCE
Refills: 0 | Status: DISCONTINUED | OUTPATIENT
Start: 2023-01-13 | End: 2023-01-13

## 2023-01-13 RX ADMIN — Medication 10 MILLIGRAM(S): at 13:59

## 2023-01-13 RX ADMIN — Medication 15 MILLIGRAM(S): at 09:55

## 2023-01-13 RX ADMIN — SODIUM CHLORIDE 1000 MILLILITER(S): 9 INJECTION INTRAMUSCULAR; INTRAVENOUS; SUBCUTANEOUS at 09:56

## 2023-01-13 NOTE — ED PROVIDER NOTE - OBJECTIVE STATEMENT
52-year-old female complaining of 5-day history of illness.  Patient with generalized weakness, left forearm pain, headache, bilateral shoulder aches.  Denies sick contacts, denies fever/chills, positive occasional sweat.  States noted a small red "bug bite" on left forearm which was itching and swollen.  Patient noticed increased swelling to forearm and then swelling to the face.  Denies tooth pain, throat pain, dysphagia, congestion, phlegm.  Positive cough but states has mild chronic cough.  No shortness of breath.  Patient today with increased pain to both arms and head, used ibuprofen 600 mg with no relief.  No history of allergy, no history of trauma to extremity, no visualized insect, no discharge at site.  Had flu and COVID vaccination previously.

## 2023-01-13 NOTE — ED PROVIDER NOTE - PHYSICAL EXAMINATION
Constitutional: VS reviewed. Alert and orientedx3, fatigued appearing  HEENT: Atraumatic, EOMI  CV: RRR  Lungs: Clear and equal bilaterally, no wheezes, rales or crackles  Abdomen: Soft, nondistended, nontender  MSK: No deformities  Skin: Warm and dry. Small raised macule on left wrist with no surrounding erythema, swelling or rash.   Neuro: Appears nonfocal  Lymph: No pitting edema in extremities.

## 2023-01-13 NOTE — ED PROVIDER NOTE - ATTENDING CONTRIBUTION TO CARE
Seen and examined, 5-day history of illness, states onset was same day as noted bug bite to left forearm.  Now complaining of headache shoulder ache and generalized body aches and weakness, no relief with ibuprofen 600 mg.  No sick contacts.  No nausea/vomiting/diarrhea.  No urinary complaints.  Has mild chronic cough which is persistent this week.  Denies shortness of breath.  In ED complaining of bifrontal headache without neck pain and with pain to the left forearm in the area of "bite".  MMM, full ROM neck, no ant. LNs, no facial asymmetry, NT teeth, no trismus, NT spine, clear lungs, heart reg, abd soft, NT to palp, no julito/guarding, no CVAT, no edema, NT calves, L forearm w punctate pustule, no sig. change in arm size/girth c/w R forearm, full ROM digits/wrist/elbow, NT shoulder.

## 2023-01-13 NOTE — ED PROVIDER NOTE - PATIENT PORTAL LINK FT
You can access the FollowMyHealth Patient Portal offered by [facility] by registering at the following website: [email or web address]. By joining [employer]’s FollowMyHealth portal, you will also be able to view your health information using other applications (apps) compatible with our system.

## 2023-01-13 NOTE — ED ADULT TRIAGE NOTE - CHIEF COMPLAINT QUOTE
pt c/o of 5 days body aches, weakness, headache. denies n/v, fever.  son now with pt, states she got a bite on her left arm  and has been having paint ever since. noted single small pink papular spot on left arm, no swelling noted. PMh: HTN, DM fs= 214

## 2023-01-13 NOTE — ED PROVIDER NOTE - PROGRESS NOTE DETAILS
Sendy Orona PGY1: Pt reassessed and states she has improvement in symptoms. Pt states her headache has improved. Pt asking to be discharged. Pt okay to go home.

## 2023-01-13 NOTE — ED PROVIDER NOTE - CLINICAL SUMMARY MEDICAL DECISION MAKING FREE TEXT BOX
52-year-old female complaining of 5-day history of illness.  Patient with generalized weakness, left forearm pain, headache, bilateral shoulder aches. Pt has small bug bite on left UE with no further rashes. Differentials include but limited to viral illness, flu vs covid, dehydration, insect bite. Will get CBC, CMP and RVP. Will give meds for headache and reassess. Dispo likely home.

## 2023-01-13 NOTE — ED ADULT NURSE NOTE - OBJECTIVE STATEMENT
Pt AOX4 c/o generalized pain in joints, headache and weakness x 3 days, pt was bit by an insect on Left wrist and believes this is the cause of her symptoms (pt speaks Lithuanian, son at bedside is bilingual) small pinpoint bite noted to Left wrisat, no swelling inarea, pt reports it is itchy; son states pt's face is swollen compared to usual appearance; pt also has congestion, runny nose. 20G IV placed Right AC, labs drawn and sent. Awaiting further Md orders.

## 2023-12-12 ENCOUNTER — EMERGENCY (EMERGENCY)
Facility: HOSPITAL | Age: 53
LOS: 1 days | Discharge: HOME | End: 2023-12-12
Attending: STUDENT IN AN ORGANIZED HEALTH CARE EDUCATION/TRAINING PROGRAM | Admitting: STUDENT IN AN ORGANIZED HEALTH CARE EDUCATION/TRAINING PROGRAM
Payer: MEDICAID

## 2023-12-12 VITALS
SYSTOLIC BLOOD PRESSURE: 115 MMHG | DIASTOLIC BLOOD PRESSURE: 67 MMHG | TEMPERATURE: 98 F | OXYGEN SATURATION: 98 % | HEART RATE: 86 BPM | RESPIRATION RATE: 17 BRPM

## 2023-12-12 VITALS
RESPIRATION RATE: 18 BRPM | HEART RATE: 85 BPM | SYSTOLIC BLOOD PRESSURE: 130 MMHG | OXYGEN SATURATION: 99 % | DIASTOLIC BLOOD PRESSURE: 62 MMHG | TEMPERATURE: 98 F

## 2023-12-12 DIAGNOSIS — Z90.89 ACQUIRED ABSENCE OF OTHER ORGANS: Chronic | ICD-10-CM

## 2023-12-12 LAB
ALBUMIN SERPL ELPH-MCNC: 3.8 G/DL — SIGNIFICANT CHANGE UP (ref 3.3–5)
ALBUMIN SERPL ELPH-MCNC: 3.8 G/DL — SIGNIFICANT CHANGE UP (ref 3.3–5)
ALP SERPL-CCNC: 82 U/L — SIGNIFICANT CHANGE UP (ref 40–120)
ALP SERPL-CCNC: 82 U/L — SIGNIFICANT CHANGE UP (ref 40–120)
ALT FLD-CCNC: 39 U/L — HIGH (ref 4–33)
ALT FLD-CCNC: 39 U/L — HIGH (ref 4–33)
ANION GAP SERPL CALC-SCNC: 11 MMOL/L — SIGNIFICANT CHANGE UP (ref 7–14)
ANION GAP SERPL CALC-SCNC: 11 MMOL/L — SIGNIFICANT CHANGE UP (ref 7–14)
ANISOCYTOSIS BLD QL: SLIGHT — SIGNIFICANT CHANGE UP
ANISOCYTOSIS BLD QL: SLIGHT — SIGNIFICANT CHANGE UP
AST SERPL-CCNC: 39 U/L — HIGH (ref 4–32)
AST SERPL-CCNC: 39 U/L — HIGH (ref 4–32)
BASOPHILS # BLD AUTO: 0.18 K/UL — SIGNIFICANT CHANGE UP (ref 0–0.2)
BASOPHILS # BLD AUTO: 0.18 K/UL — SIGNIFICANT CHANGE UP (ref 0–0.2)
BASOPHILS NFR BLD AUTO: 1.7 % — SIGNIFICANT CHANGE UP (ref 0–2)
BASOPHILS NFR BLD AUTO: 1.7 % — SIGNIFICANT CHANGE UP (ref 0–2)
BILIRUB SERPL-MCNC: 0.2 MG/DL — SIGNIFICANT CHANGE UP (ref 0.2–1.2)
BILIRUB SERPL-MCNC: 0.2 MG/DL — SIGNIFICANT CHANGE UP (ref 0.2–1.2)
BUN SERPL-MCNC: 10 MG/DL — SIGNIFICANT CHANGE UP (ref 7–23)
BUN SERPL-MCNC: 10 MG/DL — SIGNIFICANT CHANGE UP (ref 7–23)
CALCIUM SERPL-MCNC: 9.3 MG/DL — SIGNIFICANT CHANGE UP (ref 8.4–10.5)
CALCIUM SERPL-MCNC: 9.3 MG/DL — SIGNIFICANT CHANGE UP (ref 8.4–10.5)
CHLORIDE SERPL-SCNC: 103 MMOL/L — SIGNIFICANT CHANGE UP (ref 98–107)
CHLORIDE SERPL-SCNC: 103 MMOL/L — SIGNIFICANT CHANGE UP (ref 98–107)
CO2 SERPL-SCNC: 24 MMOL/L — SIGNIFICANT CHANGE UP (ref 22–31)
CO2 SERPL-SCNC: 24 MMOL/L — SIGNIFICANT CHANGE UP (ref 22–31)
CREAT SERPL-MCNC: 0.69 MG/DL — SIGNIFICANT CHANGE UP (ref 0.5–1.3)
CREAT SERPL-MCNC: 0.69 MG/DL — SIGNIFICANT CHANGE UP (ref 0.5–1.3)
EGFR: 105 ML/MIN/1.73M2 — SIGNIFICANT CHANGE UP
EGFR: 105 ML/MIN/1.73M2 — SIGNIFICANT CHANGE UP
EOSINOPHIL # BLD AUTO: 0.28 K/UL — SIGNIFICANT CHANGE UP (ref 0–0.5)
EOSINOPHIL # BLD AUTO: 0.28 K/UL — SIGNIFICANT CHANGE UP (ref 0–0.5)
EOSINOPHIL NFR BLD AUTO: 2.6 % — SIGNIFICANT CHANGE UP (ref 0–6)
EOSINOPHIL NFR BLD AUTO: 2.6 % — SIGNIFICANT CHANGE UP (ref 0–6)
GLUCOSE SERPL-MCNC: 179 MG/DL — HIGH (ref 70–99)
GLUCOSE SERPL-MCNC: 179 MG/DL — HIGH (ref 70–99)
HCT VFR BLD CALC: 38 % — SIGNIFICANT CHANGE UP (ref 34.5–45)
HCT VFR BLD CALC: 38 % — SIGNIFICANT CHANGE UP (ref 34.5–45)
HGB BLD-MCNC: 11.8 G/DL — SIGNIFICANT CHANGE UP (ref 11.5–15.5)
HGB BLD-MCNC: 11.8 G/DL — SIGNIFICANT CHANGE UP (ref 11.5–15.5)
HYPOCHROMIA BLD QL: SLIGHT — SIGNIFICANT CHANGE UP
HYPOCHROMIA BLD QL: SLIGHT — SIGNIFICANT CHANGE UP
IANC: 4.22 K/UL — SIGNIFICANT CHANGE UP (ref 1.8–7.4)
IANC: 4.22 K/UL — SIGNIFICANT CHANGE UP (ref 1.8–7.4)
LYMPHOCYTES # BLD AUTO: 4.69 K/UL — HIGH (ref 1–3.3)
LYMPHOCYTES # BLD AUTO: 4.69 K/UL — HIGH (ref 1–3.3)
LYMPHOCYTES # BLD AUTO: 43.5 % — SIGNIFICANT CHANGE UP (ref 13–44)
LYMPHOCYTES # BLD AUTO: 43.5 % — SIGNIFICANT CHANGE UP (ref 13–44)
MCHC RBC-ENTMCNC: 23.9 PG — LOW (ref 27–34)
MCHC RBC-ENTMCNC: 23.9 PG — LOW (ref 27–34)
MCHC RBC-ENTMCNC: 31.1 GM/DL — LOW (ref 32–36)
MCHC RBC-ENTMCNC: 31.1 GM/DL — LOW (ref 32–36)
MCV RBC AUTO: 76.9 FL — LOW (ref 80–100)
MCV RBC AUTO: 76.9 FL — LOW (ref 80–100)
MICROCYTES BLD QL: SLIGHT — SIGNIFICANT CHANGE UP
MICROCYTES BLD QL: SLIGHT — SIGNIFICANT CHANGE UP
MONOCYTES # BLD AUTO: 0.84 K/UL — SIGNIFICANT CHANGE UP (ref 0–0.9)
MONOCYTES # BLD AUTO: 0.84 K/UL — SIGNIFICANT CHANGE UP (ref 0–0.9)
MONOCYTES NFR BLD AUTO: 7.8 % — SIGNIFICANT CHANGE UP (ref 2–14)
MONOCYTES NFR BLD AUTO: 7.8 % — SIGNIFICANT CHANGE UP (ref 2–14)
NEUTROPHILS # BLD AUTO: 4.31 K/UL — SIGNIFICANT CHANGE UP (ref 1.8–7.4)
NEUTROPHILS # BLD AUTO: 4.31 K/UL — SIGNIFICANT CHANGE UP (ref 1.8–7.4)
NEUTROPHILS NFR BLD AUTO: 40 % — LOW (ref 43–77)
NEUTROPHILS NFR BLD AUTO: 40 % — LOW (ref 43–77)
PLAT MORPH BLD: NORMAL — SIGNIFICANT CHANGE UP
PLAT MORPH BLD: NORMAL — SIGNIFICANT CHANGE UP
PLATELET # BLD AUTO: 194 K/UL — SIGNIFICANT CHANGE UP (ref 150–400)
PLATELET # BLD AUTO: 194 K/UL — SIGNIFICANT CHANGE UP (ref 150–400)
PLATELET COUNT - ESTIMATE: NORMAL — SIGNIFICANT CHANGE UP
PLATELET COUNT - ESTIMATE: NORMAL — SIGNIFICANT CHANGE UP
POLYCHROMASIA BLD QL SMEAR: SLIGHT — SIGNIFICANT CHANGE UP
POLYCHROMASIA BLD QL SMEAR: SLIGHT — SIGNIFICANT CHANGE UP
POTASSIUM SERPL-MCNC: 4.2 MMOL/L — SIGNIFICANT CHANGE UP (ref 3.5–5.3)
POTASSIUM SERPL-MCNC: 4.2 MMOL/L — SIGNIFICANT CHANGE UP (ref 3.5–5.3)
POTASSIUM SERPL-SCNC: 4.2 MMOL/L — SIGNIFICANT CHANGE UP (ref 3.5–5.3)
POTASSIUM SERPL-SCNC: 4.2 MMOL/L — SIGNIFICANT CHANGE UP (ref 3.5–5.3)
PROT SERPL-MCNC: 8.4 G/DL — HIGH (ref 6–8.3)
PROT SERPL-MCNC: 8.4 G/DL — HIGH (ref 6–8.3)
RBC # BLD: 4.94 M/UL — SIGNIFICANT CHANGE UP (ref 3.8–5.2)
RBC # BLD: 4.94 M/UL — SIGNIFICANT CHANGE UP (ref 3.8–5.2)
RBC # FLD: 15.9 % — HIGH (ref 10.3–14.5)
RBC # FLD: 15.9 % — HIGH (ref 10.3–14.5)
RBC BLD AUTO: ABNORMAL
RBC BLD AUTO: ABNORMAL
SMUDGE CELLS # BLD: PRESENT — SIGNIFICANT CHANGE UP
SMUDGE CELLS # BLD: PRESENT — SIGNIFICANT CHANGE UP
SODIUM SERPL-SCNC: 138 MMOL/L — SIGNIFICANT CHANGE UP (ref 135–145)
SODIUM SERPL-SCNC: 138 MMOL/L — SIGNIFICANT CHANGE UP (ref 135–145)
VARIANT LYMPHS # BLD: 4.4 % — SIGNIFICANT CHANGE UP (ref 0–6)
VARIANT LYMPHS # BLD: 4.4 % — SIGNIFICANT CHANGE UP (ref 0–6)
WBC # BLD: 10.78 K/UL — HIGH (ref 3.8–10.5)
WBC # BLD: 10.78 K/UL — HIGH (ref 3.8–10.5)
WBC # FLD AUTO: 10.78 K/UL — HIGH (ref 3.8–10.5)
WBC # FLD AUTO: 10.78 K/UL — HIGH (ref 3.8–10.5)

## 2023-12-12 PROCEDURE — 99285 EMERGENCY DEPT VISIT HI MDM: CPT

## 2023-12-12 PROCEDURE — 70450 CT HEAD/BRAIN W/O DYE: CPT | Mod: 26,MA

## 2023-12-12 RX ORDER — METOCLOPRAMIDE HCL 10 MG
10 TABLET ORAL ONCE
Refills: 0 | Status: COMPLETED | OUTPATIENT
Start: 2023-12-12 | End: 2023-12-12

## 2023-12-12 RX ORDER — ACETAMINOPHEN 500 MG
975 TABLET ORAL ONCE
Refills: 0 | Status: COMPLETED | OUTPATIENT
Start: 2023-12-12 | End: 2023-12-12

## 2023-12-12 RX ORDER — SODIUM CHLORIDE 9 MG/ML
1000 INJECTION INTRAMUSCULAR; INTRAVENOUS; SUBCUTANEOUS ONCE
Refills: 0 | Status: COMPLETED | OUTPATIENT
Start: 2023-12-12 | End: 2023-12-12

## 2023-12-12 RX ORDER — MAGNESIUM SULFATE 500 MG/ML
2 VIAL (ML) INJECTION ONCE
Refills: 0 | Status: COMPLETED | OUTPATIENT
Start: 2023-12-12 | End: 2023-12-12

## 2023-12-12 RX ADMIN — Medication 975 MILLIGRAM(S): at 14:45

## 2023-12-12 RX ADMIN — Medication 10 MILLIGRAM(S): at 14:45

## 2023-12-12 RX ADMIN — Medication 25 GRAM(S): at 14:45

## 2023-12-12 RX ADMIN — SODIUM CHLORIDE 1000 MILLILITER(S): 9 INJECTION INTRAMUSCULAR; INTRAVENOUS; SUBCUTANEOUS at 14:45

## 2023-12-12 NOTE — ED PROVIDER NOTE - NSFOLLOWUPINSTRUCTIONS_ED_ALL_ED_FT
Today you were seen at [facility] ED for headache. While you were here we preformed blood work that do Today you were seen at [facility] ED for headache. While you were here we preformed blood work that do Today you were seen at [facility] ED for headache. While you were here we preformed blood work that does not show signs of infection. CT head does not show signs of intracranial bleeding. We believe your headache is due to a migrane or tension headache. We will give you the name of a neurologist to follow up with. Please return to the emergency department if you develop worsening headache, continued nausea, vomiting, changes in vision, dizziness, lightheadedness. Today you were seen at [facility] ED for headache. While you were here we preformed blood work that does not show signs of infection. CT head does not show signs of intracranial bleeding. We believe your headache is due to a migrane or tension headache. We will give you the name of a neurologist to follow up with. Please return to the emergency department if you develop worsening headache, continued nausea, vomiting, changes in vision, dizziness, lightheadedness.

## 2023-12-12 NOTE — ED ADULT NURSE NOTE - OBJECTIVE STATEMENT
Pt A+Ox4.  Pt c/o intermittent headache for several weeks.  Pt states the last 3 days hear head has hurt without relief.  Pt also states she vomited and has been having elevated blood pressures for 3 days.  Pt lungs clear, equal and unlabored, abdomen soft, skin intact.  IV placed left AC#20.  IVF infusing.  Cardiac monitor  in place.

## 2023-12-12 NOTE — ED PROVIDER NOTE - ATTENDING CONTRIBUTION TO CARE
I (Zoie) agree with above, I performed a history and physical. Counseled chris medical staff, physician assistant, and/or medical student on medical decision making as documented. Medical decisions and treatment interventions were made in real time during the patient encounter. Additionally and/or with the following exceptions: 53-year-old female past medical history of hypertension, diabetes type 2 not on insulin, thyroidectomy presents with headache.  Is posterior in nature in the distribution of the occipital nerve.  Had not taken anything for it.  Also has a history of a fall with possible loss of consciousness.  Has not seen a cardiologist or a physician for this.  Patient is neurologically intact 5 out of 5 in all extremities ambulatory without ataxia and generally well-appearing.  Patient was signed out to oncoming attending pending basic labs and reassessment.  CT head was negative for acute pathology and given the fact there is no red flags for the headache, suspect that the patient would be amenable to outpatient neurology follow-up.

## 2023-12-12 NOTE — ED PROVIDER NOTE - OBJECTIVE STATEMENT
54 y/o F PmHx HTN, DM2 and Thyroidectomy presents with 3 days of headache. Patient states since onset the headache has not resolved, associated posterior neck pain, 1 episode of vomiting 3 days ago. Patient endorses persistent nausea. 15 days ago patient states she fell onto her L side with +head strike, possible LOC, was not evaluated by physician following this fall. State she has subjective left sided arm weakness, however, is ambulating well, without assistance. Denies fever, chills, body aches, diarrhea, abdominal pain.

## 2023-12-12 NOTE — ED ADULT NURSE NOTE - EXTENSIONS OF SELF_ADULT
Patient remained confused and attempting to remove medical devices - maintained soft wrist restraints with close watch . Sitter at bedside at all times. Noted blisters to Lt AC and upper arm from previous IV infiltration-no other skin problems. Rt AC mid line IV in place. NG tube placement remained in place. Patient incontinent during the night -amelia care with moisture barrier used. Patient repositioned frequently. Stable hours   None

## 2023-12-12 NOTE — ED ADULT NURSE NOTE - NSFALLRISKINTERV_ED_ALL_ED
Assistance OOB with selected safe patient handling equipment if applicable/Assistance with ambulation/Communicate fall risk and risk factors to all staff, patient, and family/Monitor gait and stability/Provide patient with walking aids/Provide visual cue: yellow wristband, yellow gown, etc/Reinforce activity limits and safety measures with patient and family/Call bell, personal items and telephone in reach/Instruct patient to call for assistance before getting out of bed/chair/stretcher/Non-slip footwear applied when patient is off stretcher/[place] to call system/Physically safe environment - no spills, clutter or unnecessary equipment/Purposeful Proactive Rounding/Room/bathroom lighting operational, light cord in reach Assistance OOB with selected safe patient handling equipment if applicable/Assistance with ambulation/Communicate fall risk and risk factors to all staff, patient, and family/Monitor gait and stability/Provide patient with walking aids/Provide visual cue: yellow wristband, yellow gown, etc/Reinforce activity limits and safety measures with patient and family/Call bell, personal items and telephone in reach/Instruct patient to call for assistance before getting out of bed/chair/stretcher/Non-slip footwear applied when patient is off stretcher/[place] to call system/Physically safe environment - no spills, clutter or unnecessary equipment/Purposeful Proactive Rounding/Room/bathroom lighting operational, light cord in reach

## 2023-12-12 NOTE — ED ADULT TRIAGE NOTE - CHIEF COMPLAINT QUOTE
pt living with DM type2, c/o of headaches and high b/p readings for 3 days, pt vomited once 3 days ago, no neuro deficits noted

## 2023-12-12 NOTE — ED PROVIDER NOTE - CLINICAL SUMMARY MEDICAL DECISION MAKING FREE TEXT BOX
54 y/o F PmHx HTN, DM2 and Thyroidectomy presents with 3 days of headache. Patient states since onset the headache has not resolved, associated posterior neck pain, 1 episode of vomiting 3 days ago. Patient endorses persistent nausea. 15 days ago patient states she fell onto her L side with +head strike, possible LOC, was not evaluated by physician following this fall. State she has subjective left sided arm weakness, however, is ambulating well, without assistance. Denies fever, chills, body aches, diarrhea, abdominal pain. 52 y/o F PmHx HTN, DM2 and Thyroidectomy presents with 3 days of headache. Patient states since onset the headache has not resolved, associated posterior neck pain, 1 episode of vomiting 3 days ago. Patient endorses persistent nausea. 15 days ago patient states she fell onto her L side with +head strike, possible LOC, was not evaluated by physician following this fall. State she has subjective left sided arm weakness, however, is ambulating well, without assistance. Denies fever, chills, body aches, diarrhea, abdominal pain. 52 y/o F PmHx HTN, DM2 and Thyroidectomy presents with 3 days of headache. Patient states since onset the headache has not resolved, associated posterior neck pain, 1 episode of vomiting 3 days ago. Patient endorses persistent nausea. 15 days ago patient states she fell onto her L side with +head strike, possible LOC, was not evaluated by physician following this fall. State she has subjective left sided arm weakness, however, is ambulating well, without assistance. Denies fever, chills, body aches, diarrhea, abdominal pain. Differential includes migrane headache, tension headache, cluster headache, subdural hematoma, less likely viral URI. Will order labs, CTH non-contrast. Recommend f/u with neurology.

## 2023-12-12 NOTE — ED PROVIDER NOTE - PATIENT PORTAL LINK FT
You can access the FollowMyHealth Patient Portal offered by [facility] by registering at the following website: [email or web address]. By joining [employer]’s FollowMyHealth portal, you will also be able to view your health information using other applications (apps) compatible with our system. You can access the FollowMyHealth Patient Portal offered by [facility] by registering at the following website: [email or web address]. By joining [employer]’s FollowMyHealth portal, you will also be able to view your health information using other applications (apps) compatible with our system.

## 2023-12-12 NOTE — ED PROVIDER NOTE - NSFOLLOWUPCLINICS_GEN_ALL_ED_FT
[facility]  Neurology  [address] [place] [address]  Phone: [phone]  Fax: [phone]     [facility]  Neurology  [address] [place] [address]  Phone: [phone]  Fax: [phone]

## 2024-03-21 NOTE — ED ADULT NURSE NOTE - BEFAST EYES
Problem: PAIN - ADULT  Goal: Verbalizes/displays adequate comfort level or baseline comfort level  Description: Interventions:  - Encourage patient to monitor pain and request assistance  - Assess pain using appropriate pain scale  - Administer analgesics based on type and severity of pain and evaluate response  - Implement non-pharmacological measures as appropriate and evaluate response  - Consider cultural and social influences on pain and pain management  - Notify physician/advanced practitioner if interventions unsuccessful or patient reports new pain  Outcome: Progressing     Problem: DISCHARGE PLANNING  Goal: Discharge to home or other facility with appropriate resources  Description: INTERVENTIONS:  - Identify barriers to discharge w/patient and caregiver  - Arrange for needed discharge resources and transportation as appropriate  - Identify discharge learning needs (meds, wound care, etc.)  - Arrange for interpretive services to assist at discharge as needed  - Refer to Case Management Department for coordinating discharge planning if the patient needs post-hospital services based on physician/advanced practitioner order or complex needs related to functional status, cognitive ability, or social support system  Outcome: Progressing     Problem: Nutrition/Hydration-ADULT  Goal: Nutrient/Hydration intake appropriate for improving, restoring or maintaining nutritional needs  Description: Monitor and assess patient's nutrition/hydration status for malnutrition. Collaborate with interdisciplinary team and initiate plan and interventions as ordered.  Monitor patient's weight and dietary intake as ordered or per policy. Utilize nutrition screening tool and intervene as necessary. Determine patient's food preferences and provide high-protein, high-caloric foods as appropriate.     INTERVENTIONS:  - Monitor oral intake, urinary output, labs, and treatment plans  - Assess nutrition and hydration status and  recommend course of action  - Evaluate amount of meals eaten  - Assist patient with eating if necessary   - Allow adequate time for meals  - Recommend/ encourage appropriate diets, oral nutritional supplements, and vitamin/mineral supplements  - Order, calculate, and assess calorie counts as needed  - Recommend, monitor, and adjust tube feedings and TPN/PPN based on assessed needs  - Assess need for intravenous fluids  - Provide specific nutrition/hydration education as appropriate  - Include patient/family/caregiver in decisions related to nutrition  Outcome: Progressing      No

## 2024-03-30 ENCOUNTER — EMERGENCY (EMERGENCY)
Facility: HOSPITAL | Age: 54
LOS: 1 days | Discharge: LEFT BEFORE DISCHARGE | End: 2024-03-30
Admitting: EMERGENCY MEDICINE
Payer: MEDICAID

## 2024-03-30 VITALS
OXYGEN SATURATION: 96 % | TEMPERATURE: 98 F | SYSTOLIC BLOOD PRESSURE: 119 MMHG | HEART RATE: 103 BPM | DIASTOLIC BLOOD PRESSURE: 82 MMHG | RESPIRATION RATE: 15 BRPM

## 2024-03-30 DIAGNOSIS — Z90.89 ACQUIRED ABSENCE OF OTHER ORGANS: Chronic | ICD-10-CM

## 2024-03-30 PROCEDURE — L9991: CPT

## 2024-03-30 NOTE — ED ADULT TRIAGE NOTE - CHIEF COMPLAINT QUOTE
alert oriented Mohawk speaking son will translate c/o headache productive cough body aches and fever x 4 days  hx HTN DM2

## 2024-03-31 NOTE — ED ADULT NURSE NOTE - CHIEF COMPLAINT QUOTE
alert oriented Amharic speaking son will translate c/o headache productive cough body aches and fever x 4 days  hx HTN DM2

## 2024-09-24 NOTE — ED PROVIDER NOTE - IV ALTEPLASE INCLUSION HIDDEN
Post-op Note    HPI    Leslie Tolliver is here 4 months s/p the following procedure:     Intramedullary nailing left femur    Overall doing well in regards to her left hip.  She denies any pain.  She is getting more mobile.  She is able to cook and clean do her normal activities of daily living.  Her main complaint today is bilateral lower extremity swelling and weeping.  She was recently prescribed Lasix.  She has not started taking this yet.  Has had ultrasounds as well which were negative for DVT    Physical Exam:     Patient is alert and oriented no acute distress.   Assistive Device:  Wheelchair    Left hip Incision(s) are well healed.  There is no evidence of dehiscence.  There is no induration erythema or signs of infection.  Compartments are soft and compressible.  Warm well-perfused extremity.  No ecchymosis or swelling of the thigh.  No pain with passive range of motion of the hip.  No gross deformity.  Bilateral lower extremity edema with skin changes and ulcerations, blisters.  Mild weeping    Imaging:     I have personally reviewed the following imaging and these are an interpretation of my findings:     X-Ray: I have reviewed all pertinent results/findings and my personal findings are:  Status post intramedullary nailing left femur with overall good alignment.  No complication.  Fracture seems to be a healed appropriately    Assessment    Leslie Tolliver is 4 months Post-op     Plan:    Overall doing well in regards to her left hip.  No significant pain.  Getting more functional and ambulatory.  In regards to her bilateral lower extremity edema and swelling, recommend follow up with PCP.  Recently prescribed Lasix but has not started taking it yet.          
show

## 2024-10-21 NOTE — ED PROVIDER NOTE - DOMESTIC TRAVEL HIGH RISK QUESTION
Is This A New Presentation, Or A Follow-Up?: Skin Lesion Have Your Skin Lesions Been Treated?: not been treated Detail Level: Detailed Require Ndc Code?: No Concentration Of Kenalog Solution Injected (Mg/Ml): 2.5 Medical Necessity Clause: This procedure was medically necessary because the lesions that were treated were: Administered By (Optional): Dr. López How Many Mls Were Removed From The 40 Mg/Ml (1ml) Vial When Preparing The Injectable Solution?: 0 Consent: The risks of atrophy and dyspigmentation were reviewed with the patient. Lot # For Kenalog (Optional): [id number] Expiration Date For Kenalog (Optional): 02/2026 No Validate Note Data When Using Inventory: Yes Total Volume (Ccs): 0.2 Kenalog Preparation: Kenalog Kenalog Type Of Vial: Multiple Dose

## 2024-11-11 ENCOUNTER — EMERGENCY (EMERGENCY)
Facility: HOSPITAL | Age: 54
LOS: 1 days | Discharge: HOME | End: 2024-11-11
Attending: STUDENT IN AN ORGANIZED HEALTH CARE EDUCATION/TRAINING PROGRAM | Admitting: EMERGENCY MEDICINE
Payer: MEDICAID

## 2024-11-11 VITALS
DIASTOLIC BLOOD PRESSURE: 80 MMHG | RESPIRATION RATE: 18 BRPM | OXYGEN SATURATION: 98 % | HEART RATE: 91 BPM | TEMPERATURE: 98 F | SYSTOLIC BLOOD PRESSURE: 137 MMHG

## 2024-11-11 DIAGNOSIS — Z90.89 ACQUIRED ABSENCE OF OTHER ORGANS: Chronic | ICD-10-CM

## 2024-11-11 LAB
ALBUMIN SERPL ELPH-MCNC: 3.7 G/DL — SIGNIFICANT CHANGE UP (ref 3.3–5)
ALP SERPL-CCNC: 80 U/L — SIGNIFICANT CHANGE UP (ref 40–120)
ALT FLD-CCNC: 40 U/L — HIGH (ref 4–33)
ANION GAP SERPL CALC-SCNC: 14 MMOL/L — SIGNIFICANT CHANGE UP (ref 7–14)
APTT BLD: 32 SEC — SIGNIFICANT CHANGE UP (ref 24.5–35.6)
AST SERPL-CCNC: 34 U/L — HIGH (ref 4–32)
BASOPHILS # BLD AUTO: 0.06 K/UL — SIGNIFICANT CHANGE UP (ref 0–0.2)
BASOPHILS NFR BLD AUTO: 0.5 % — SIGNIFICANT CHANGE UP (ref 0–2)
BILIRUB SERPL-MCNC: 0.2 MG/DL — SIGNIFICANT CHANGE UP (ref 0.2–1.2)
BLOOD GAS VENOUS COMPREHENSIVE RESULT: SIGNIFICANT CHANGE UP
BUN SERPL-MCNC: 12 MG/DL — SIGNIFICANT CHANGE UP (ref 7–23)
CALCIUM SERPL-MCNC: 8.6 MG/DL — SIGNIFICANT CHANGE UP (ref 8.4–10.5)
CHLORIDE SERPL-SCNC: 102 MMOL/L — SIGNIFICANT CHANGE UP (ref 98–107)
CO2 SERPL-SCNC: 22 MMOL/L — SIGNIFICANT CHANGE UP (ref 22–31)
CREAT SERPL-MCNC: 0.69 MG/DL — SIGNIFICANT CHANGE UP (ref 0.5–1.3)
EGFR: 104 ML/MIN/1.73M2 — SIGNIFICANT CHANGE UP
EOSINOPHIL # BLD AUTO: 0.39 K/UL — SIGNIFICANT CHANGE UP (ref 0–0.5)
EOSINOPHIL NFR BLD AUTO: 3.5 % — SIGNIFICANT CHANGE UP (ref 0–6)
GLUCOSE SERPL-MCNC: 186 MG/DL — HIGH (ref 70–99)
HCT VFR BLD CALC: 39.9 % — SIGNIFICANT CHANGE UP (ref 34.5–45)
HGB BLD-MCNC: 12.9 G/DL — SIGNIFICANT CHANGE UP (ref 11.5–15.5)
IANC: 4.82 K/UL — SIGNIFICANT CHANGE UP (ref 1.8–7.4)
IMM GRANULOCYTES NFR BLD AUTO: 0.5 % — SIGNIFICANT CHANGE UP (ref 0–0.9)
INR BLD: 0.9 RATIO — SIGNIFICANT CHANGE UP (ref 0.85–1.16)
LYMPHOCYTES # BLD AUTO: 4.98 K/UL — HIGH (ref 1–3.3)
LYMPHOCYTES # BLD AUTO: 44.2 % — HIGH (ref 13–44)
MCHC RBC-ENTMCNC: 25.3 PG — LOW (ref 27–34)
MCHC RBC-ENTMCNC: 32.3 G/DL — SIGNIFICANT CHANGE UP (ref 32–36)
MCV RBC AUTO: 78.4 FL — LOW (ref 80–100)
MONOCYTES # BLD AUTO: 0.95 K/UL — HIGH (ref 0–0.9)
MONOCYTES NFR BLD AUTO: 8.4 % — SIGNIFICANT CHANGE UP (ref 2–14)
NEUTROPHILS # BLD AUTO: 4.82 K/UL — SIGNIFICANT CHANGE UP (ref 1.8–7.4)
NEUTROPHILS NFR BLD AUTO: 42.9 % — LOW (ref 43–77)
NRBC # BLD: 0 /100 WBCS — SIGNIFICANT CHANGE UP (ref 0–0)
NRBC # FLD: 0 K/UL — SIGNIFICANT CHANGE UP (ref 0–0)
NT-PROBNP SERPL-SCNC: <36 PG/ML — SIGNIFICANT CHANGE UP
PLATELET # BLD AUTO: 201 K/UL — SIGNIFICANT CHANGE UP (ref 150–400)
POTASSIUM SERPL-MCNC: 3.6 MMOL/L — SIGNIFICANT CHANGE UP (ref 3.5–5.3)
POTASSIUM SERPL-SCNC: 3.6 MMOL/L — SIGNIFICANT CHANGE UP (ref 3.5–5.3)
PROT SERPL-MCNC: 7.2 G/DL — SIGNIFICANT CHANGE UP (ref 6–8.3)
PROTHROM AB SERPL-ACNC: 10.7 SEC — SIGNIFICANT CHANGE UP (ref 9.9–13.4)
RBC # BLD: 5.09 M/UL — SIGNIFICANT CHANGE UP (ref 3.8–5.2)
RBC # FLD: 15.1 % — HIGH (ref 10.3–14.5)
SODIUM SERPL-SCNC: 138 MMOL/L — SIGNIFICANT CHANGE UP (ref 135–145)
TROPONIN T, HIGH SENSITIVITY RESULT: <6 NG/L — SIGNIFICANT CHANGE UP
WBC # BLD: 11.26 K/UL — HIGH (ref 3.8–10.5)
WBC # FLD AUTO: 11.26 K/UL — HIGH (ref 3.8–10.5)

## 2024-11-11 PROCEDURE — 70496 CT ANGIOGRAPHY HEAD: CPT | Mod: 26,MC

## 2024-11-11 PROCEDURE — 71045 X-RAY EXAM CHEST 1 VIEW: CPT | Mod: 26

## 2024-11-11 PROCEDURE — 99223 1ST HOSP IP/OBS HIGH 75: CPT

## 2024-11-11 PROCEDURE — 70498 CT ANGIOGRAPHY NECK: CPT | Mod: 26,MC

## 2024-11-11 RX ORDER — HYDROCORTISONE 10 MG/1
200 TABLET ORAL ONCE
Refills: 0 | Status: COMPLETED | OUTPATIENT
Start: 2024-11-11 | End: 2024-11-11

## 2024-11-11 RX ORDER — METOCLOPRAMIDE HCL 10 MG
10 TABLET ORAL ONCE
Refills: 0 | Status: COMPLETED | OUTPATIENT
Start: 2024-11-11 | End: 2024-11-11

## 2024-11-11 RX ORDER — DIPHENHYDRAMINE HCL 12.5MG/5ML
50 ELIXIR ORAL ONCE
Refills: 0 | Status: COMPLETED | OUTPATIENT
Start: 2024-11-11 | End: 2024-11-11

## 2024-11-11 RX ORDER — MAGNESIUM SULFATE IN 0.9% NACL 2 G/50 ML
2 INTRAVENOUS SOLUTION, PIGGYBACK (ML) INTRAVENOUS ONCE
Refills: 0 | Status: COMPLETED | OUTPATIENT
Start: 2024-11-11 | End: 2024-11-11

## 2024-11-11 RX ORDER — ASPIRIN/MAG CARB/ALUMINUM AMIN 325 MG
324 TABLET ORAL ONCE
Refills: 0 | Status: COMPLETED | OUTPATIENT
Start: 2024-11-11 | End: 2024-11-11

## 2024-11-11 RX ADMIN — Medication 25 GRAM(S): at 12:27

## 2024-11-11 RX ADMIN — Medication 50 MILLIGRAM(S): at 17:00

## 2024-11-11 RX ADMIN — Medication 324 MILLIGRAM(S): at 12:26

## 2024-11-11 RX ADMIN — HYDROCORTISONE 200 MILLIGRAM(S): 10 TABLET ORAL at 14:01

## 2024-11-11 RX ADMIN — Medication 10 MILLIGRAM(S): at 12:26

## 2024-11-11 NOTE — ED ADULT NURSE NOTE - NSFALLUNIVINTERV_ED_ALL_ED
Bed/Stretcher in lowest position, wheels locked, appropriate side rails in place/Call bell, personal items and telephone in reach/Instruct patient to call for assistance before getting out of bed/chair/stretcher/Non-slip footwear applied when patient is off stretcher/[place] to call system/Physically safe environment - no spills, clutter or unnecessary equipment/Purposeful proactive rounding/Room/bathroom lighting operational, light cord in reach

## 2024-11-11 NOTE — ED CDU PROVIDER INITIAL DAY NOTE - CLINICAL SUMMARY MEDICAL DECISION MAKING FREE TEXT BOX
54-year-old female past medical history of hypertension and diabetes not on insulin is presenting to the emergency department with headache and chest pain.  Headache is 8 out of 10 as an radiation to her shoulders.  Pain starts gradually and has been gradually worsening.  Took ibuprofen for the pain but it did not help.  No history of fevers.  Has been going on for 2 days.  Her chest pain is central chest pain, nonexertional, pressure-like.  Has not seen a cardiologist.  Chest pain has going on for about 2 weeks.  No shortness of breath. Patient underwent EKG which was normal sinus rhythm without ST elevations or depressions.  Labs within normal limits, nonactionable.  Troponin <6.  CTA of head and neck negative for acute intracranial pathology.  Chest x-ray clear.  Patient placed in CDU for stress, echo, telemetry monitoring, cardiac consult.

## 2024-11-11 NOTE — ED CDU PROVIDER INITIAL DAY NOTE - ATTENDING APP SHARED VISIT CONTRIBUTION OF CARE
I, Wily Kaufman, DO personally saw the patient with YANIQUE.  I have personally performed a face to face diagnostic evaluation on this patient.  I have reviewed the YANIQUE note and agree with the history, exam, and plan of care, except as noted.  I personally saw the patient and performed a substantive portion of the visit including all aspects of the medical decision making.

## 2024-11-11 NOTE — ED ADULT NURSE NOTE - OBJECTIVE STATEMENT
Intake pt coming with 1 wk of dizziness, Nausea, and Occipital area pain, pt is AOX 3, c/o blur vision, CP, denies cough, chills, no facial droop noted, no slur speech, yahir. upper and lower extrem. equal strength,    Labs sent, IV to right lat. AC 20g angio cath place,  Medicated as ordered, pending dispo.  Kathy Scott RN

## 2024-11-11 NOTE — ED ADULT NURSE REASSESSMENT NOTE - NS ED NURSE REASSESS COMMENT FT1
Pt received to results waiting. Awake and alert, at baseline mental status. Respirations even and unlabored on room air. NAD noted. VS as charted. Pt placed on cardiac monitor and cont pulse ox, NSR on monitor. Pt offers no new complaints at this time. Safety maintained. Pending benadryl at 15:00 and CT

## 2024-11-11 NOTE — ED CDU PROVIDER INITIAL DAY NOTE - OBJECTIVE STATEMENT
ED NOTE: "54-year-old female past medical history of hypertension and diabetes not on insulin is presenting to the emergency department with headache and chest pain.  Headache is 8 out of 10 as an radiation to her shoulders.  Pain starts gradually and has been gradually worsening.  Took ibuprofen for the pain but it did not help.  No history of fevers.  Has been going on for 2 days.  Her chest pain is central chest pain, nonexertional, pressure-like.  Has not seen a cardiologist.  Chest pain has going on for about 2 weeks.  No shortness of breath. "    CDU ELAYNE Turner: Agree with above.  Patient underwent EKG which was normal sinus rhythm without ST elevations or depressions.  Labs within normal limits, nonactionable.  Troponin <6.  CTA of head and neck negative for acute intracranial pathology.  Chest x-ray clear.  Patient placed in CDU for stress, echo, telemetry monitoring, cardiac consult.

## 2024-11-11 NOTE — ED CDU PROVIDER INITIAL DAY NOTE - PHYSICAL EXAMINATION
CONSTITUTIONAL: Well-appearing; well-nourished; in no apparent distress. Non-toxic appearing.   NEURO: Alert & oriented. Gait steady without assistance. Sensory and motor functions are grossly intact.  PSYCH: Mood appropriate. Thought processes intact.   NECK: Supple. No midline bony tenderness, no paraspinous muscle tenderness.   CARD: Regular rate and rhythm, no murmurs  RESP: No accessory muscle use; breath sounds clear and equal bilaterally; no wheezes, rhonchi, or rales     ABD: Soft; non-distended; non-tender. No guarding or rebound.   MUSCULOSKELETAL/EXTREMITIES: FROM in all four extremities; no extremity edema.  SKIN: Warm; dry; no apparent lesions or exudate

## 2024-11-11 NOTE — ED ADULT TRIAGE NOTE - CHIEF COMPLAINT QUOTE
Patient has c/o vomiting, headache, chest pain, shoulder pain radiating to neck and head that she has had this complaint for more than a week. Patient also states she has rash spots in various places on her skin. Type 2 DM: FS: 192

## 2024-11-11 NOTE — ED PROVIDER NOTE - CLINICAL SUMMARY MEDICAL DECISION MAKING FREE TEXT BOX
54-year-old female past medical history of hypertension and diabetes not on insulin is presenting to the emergency department with headache and chest pain.  Headache is 8 out of 10 as an radiation to her shoulders.  Pain starts gradually and has been gradually worsening.  Took ibuprofen for the pain but it did not help.  No history of fevers.  Has been going on for 2 days.  Her chest pain is central chest pain, nonexertional, pressure-like.  Has not seen a cardiologist.  Chest pain has going on for about 2 weeks.  No shortness of breath.  Patient also says she has a fine rash on her arms.  Patient's vital signs are reviewed and were within normal limits.  She is resting comfortably in the stretcher, no cranial nerve deficit, pupils equal round and reactive to light.  Out of 5 in all extremities, ambulatory without ataxia.  No respiratory distress.  Has no nuchal rigidity, neck is supple.  Patient is afebrile, and has no meningeal signs low suspicion for viral meningitis.  Given chest pain EKG was obtained which was nonischemic.  Will obtain ACS rule out.  Low suspicion for PE and pain pattern not typical of dissection.  Will obtain CT angiography to characterize vessels and rule out aneurysm or AVM.  Question of cardiac cephalgia, given risk factors likely could benefit from CDU for stress test and echocardiogram if workup otherwise negative in the emergency department.

## 2024-11-11 NOTE — ED ADULT TRIAGE NOTE - TEMPERATURE IN CELSIUS (DEGREES C)
Abdominal Pain: Care Instructions  Your Care Instructions    Abdominal pain has many possible causes. Some aren't serious and get better on their own in a few days. Others need more testing and treatment. If your pain continues or gets worse, you need to be rechecked and may need more tests to find out what is wrong. You may need surgery to correct the problem. Don't ignore new symptoms, such as fever, nausea and vomiting, urination problems, pain that gets worse, and dizziness. These may be signs of a more serious problem. Your doctor may have recommended a follow-up visit in the next 8 to 12 hours. If you are not getting better, you may need more tests or treatment. The doctor has checked you carefully, but problems can develop later. If you notice any problems or new symptoms, get medical treatment right away. Follow-up care is a key part of your treatment and safety. Be sure to make and go to all appointments, and call your doctor if you are having problems. It's also a good idea to know your test results and keep a list of the medicines you take. How can you care for yourself at home? · Rest until you feel better. · To prevent dehydration, drink plenty of fluids, enough so that your urine is light yellow or clear like water. Choose water and other caffeine-free clear liquids until you feel better. If you have kidney, heart, or liver disease and have to limit fluids, talk with your doctor before you increase the amount of fluids you drink. · If your stomach is upset, eat mild foods, such as rice, dry toast or crackers, bananas, and applesauce. Try eating several small meals instead of two or three large ones. · Wait until 48 hours after all symptoms have gone away before you have spicy foods, alcohol, and drinks that contain caffeine. · Do not eat foods that are high in fat. · Avoid anti-inflammatory medicines such as aspirin, ibuprofen (Advil, Motrin), and naproxen (Aleve).  These can cause stomach upset. Talk to your doctor if you take daily aspirin for another health problem. When should you call for help? Call 911 anytime you think you may need emergency care. For example, call if:  ? · You passed out (lost consciousness). ? · You pass maroon or very bloody stools. ? · You vomit blood or what looks like coffee grounds. ? · You have new, severe belly pain. ?Call your doctor now or seek immediate medical care if:  ? · Your pain gets worse, especially if it becomes focused in one area of your belly. ? · You have a new or higher fever. ? · Your stools are black and look like tar, or they have streaks of blood. ? · You have unexpected vaginal bleeding. ? · You have symptoms of a urinary tract infection. These may include:  ¨ Pain when you urinate. ¨ Urinating more often than usual.  ¨ Blood in your urine. ? · You are dizzy or lightheaded, or you feel like you may faint. ? Watch closely for changes in your health, and be sure to contact your doctor if:  ? · You are not getting better after 1 day (24 hours). Where can you learn more? Go to http://martha-neo.info/. Enter U977 in the search box to learn more about \"Abdominal Pain: Care Instructions. \"  Current as of: March 20, 2017  Content Version: 11.4  © 0894-1224 [employer]. Care instructions adapted under license by [employer] (which disclaims liability or warranty for this information). If you have questions about a medical condition or this instruction, always ask your healthcare professional. [place] [address] any warranty or liability for your use of this information. 36.7

## 2024-11-12 VITALS
DIASTOLIC BLOOD PRESSURE: 77 MMHG | RESPIRATION RATE: 17 BRPM | OXYGEN SATURATION: 96 % | TEMPERATURE: 97 F | HEART RATE: 87 BPM | SYSTOLIC BLOOD PRESSURE: 112 MMHG

## 2024-11-12 LAB
A1C WITH ESTIMATED AVERAGE GLUCOSE RESULT: 7.5 % — HIGH (ref 4–5.6)
ADD ON TEST-SPECIMEN IN LAB: SIGNIFICANT CHANGE UP
ADD ON TEST-SPECIMEN IN LAB: SIGNIFICANT CHANGE UP
ESTIMATED AVERAGE GLUCOSE: 169 — SIGNIFICANT CHANGE UP

## 2024-11-12 PROCEDURE — 93016 CV STRESS TEST SUPVJ ONLY: CPT | Mod: GC,MC

## 2024-11-12 PROCEDURE — 78451 HT MUSCLE IMAGE SPECT SING: CPT | Mod: 26,MC

## 2024-11-12 PROCEDURE — 93306 TTE W/DOPPLER COMPLETE: CPT | Mod: 26

## 2024-11-12 PROCEDURE — 99284 EMERGENCY DEPT VISIT MOD MDM: CPT

## 2024-11-12 PROCEDURE — 93018 CV STRESS TEST I&R ONLY: CPT | Mod: GC,MC

## 2024-11-12 PROCEDURE — 99239 HOSP IP/OBS DSCHRG MGMT >30: CPT

## 2024-11-12 RX ORDER — LEVOTHYROXINE SODIUM 88 MCG
25 TABLET ORAL DAILY
Refills: 0 | Status: ACTIVE | OUTPATIENT
Start: 2024-11-12 | End: 2025-10-11

## 2024-11-12 RX ORDER — KETOTIFEN FUMARATE 0.25 MG/ML
1 SOLUTION OPHTHALMIC
Refills: 0 | Status: ACTIVE | OUTPATIENT
Start: 2024-11-12 | End: 2025-10-11

## 2024-11-12 RX ORDER — LOSARTAN POTASSIUM 25 MG/1
50 TABLET ORAL DAILY
Refills: 0 | Status: ACTIVE | OUTPATIENT
Start: 2024-11-12 | End: 2025-10-11

## 2024-11-12 RX ORDER — ESCITALOPRAM 10 MG/1
20 TABLET, FILM COATED ORAL DAILY
Refills: 0 | Status: ACTIVE | OUTPATIENT
Start: 2024-11-12 | End: 2025-10-11

## 2024-11-12 RX ORDER — MONTELUKAST SODIUM 10 MG/1
10 TABLET, FILM COATED ORAL DAILY
Refills: 0 | Status: ACTIVE | OUTPATIENT
Start: 2024-11-12 | End: 2025-10-11

## 2024-11-12 RX ORDER — AMLODIPINE BESYLATE 10 MG
5 TABLET ORAL DAILY
Refills: 0 | Status: ACTIVE | OUTPATIENT
Start: 2024-11-12 | End: 2025-10-11

## 2024-11-12 RX ADMIN — KETOTIFEN FUMARATE 1 DROP(S): 0.25 SOLUTION OPHTHALMIC at 17:07

## 2024-11-12 RX ADMIN — Medication 25 MICROGRAM(S): at 06:06

## 2024-11-12 RX ADMIN — Medication 5 MILLIGRAM(S): at 06:06

## 2024-11-12 RX ADMIN — MONTELUKAST SODIUM 10 MILLIGRAM(S): 10 TABLET, FILM COATED ORAL at 11:47

## 2024-11-12 RX ADMIN — ESCITALOPRAM 20 MILLIGRAM(S): 10 TABLET, FILM COATED ORAL at 11:47

## 2024-11-12 RX ADMIN — LOSARTAN POTASSIUM 50 MILLIGRAM(S): 25 TABLET ORAL at 06:06

## 2024-11-12 RX ADMIN — KETOTIFEN FUMARATE 1 DROP(S): 0.25 SOLUTION OPHTHALMIC at 09:23

## 2024-11-12 NOTE — ED CDU PROVIDER SUBSEQUENT DAY NOTE - ATTENDING APP SHARED VISIT CONTRIBUTION OF CARE
Patient well-appearing no acute distress HEENT unremarkable   neck supple no JVD   cranial nerves intact   heart sounds S1-S2 lungs clear no murmurs gallops or rubs heard   abdomen soft nontender   extremities without edema neuroexam motor 5/5 sensation intact no past-pointing noted

## 2024-11-12 NOTE — CONSULT NOTE ADULT - ASSESSMENT
Patient is a 54-year-old woman with hypertension and DM2.  She presented to the [facility] ED with complaints of progressively worsening headache and chest pain over the past 2 weeks.  No other associated cardiac complaints.  Denies recent illnesses.  No recent cardiac assessment.    In the ED, the patient was clinically and hemodynamically stable.  Cardiac troponins flat.  CXR showed clear lungs.  12-lead ECG notes NSR, normal axis, normal intervals.    CTA head/neck unremarkable for acute findings    Physical examination was unremarkable.  Appears euvolemic on exam.    Echocardiogram demonstrated normal biventricular systolic function.  Nuclear stress test did not demonstrate the presence of ischemia.    Impression:   Atypical chest pain     Recommendations:  No further inpatient cardiac testing is needed at this time.  From the cardiac perspective, the patient may be discharged to home with instructions to follow up in our office ([phone]) within one month for further evaluation as needed.

## 2024-11-12 NOTE — CONSULT NOTE ADULT - SUBJECTIVE AND OBJECTIVE BOX
Cardiology/Vascular Medicine Inpatient Consultation Note    Date of Admission:        CHIEF COMPLAINT:        HISTORY OF PRESENT ILLNESS:  HPI:          Allergies    iodinated radiocontrast agents (Unknown)    Intolerances    	    MEDICATIONS:  amLODIPine   Tablet 5 milliGRAM(s) Oral daily  losartan 50 milliGRAM(s) Oral daily      montelukast 10 milliGRAM(s) Oral daily    escitalopram 20 milliGRAM(s) Oral daily      levothyroxine 25 MICROGram(s) Oral daily    ketotifen 0.025% Ophthalmic Solution 1 Drop(s) Both EYES two times a day      PAST MEDICAL & SURGICAL HISTORY:  DM (diabetes mellitus)      HTN (hypertension)      S/P tonsillectomy          FAMILY HISTORY:      SOCIAL HISTORY:    [ ] Non-smoker  [ ] Smoker  [ ] Alcohol    REVIEW OF SYSTEMS:  CONSTITUTIONAL: No fever, weight loss, or fatigue  EYES: No eye pain, visual disturbances, or discharge  ENMT:  No difficulty hearing, tinnitus, vertigo; No sinus or throat pain  NECK: No pain or stiffness  RESPIRATORY: No cough, wheezing, chills or hemoptysis; No Shortness of Breath  CARDIOVASCULAR: No chest pain, palpitations, passing out, dizziness, or leg swelling  GASTROINTESTINAL: No abdominal or epigastric pain. No nausea, vomiting, or hematemesis; No diarrhea or constipation. No melena or hematochezia.  GENITOURINARY: No dysuria, frequency, hematuria, or incontinence  NEUROLOGICAL: No headaches, memory loss, loss of strength, numbness, or tremors  SKIN: No itching, burning, rashes, or lesions   LYMPH Nodes: No enlarged glands  ENDOCRINE: No heat or cold intolerance; No hair loss  MUSCULOSKELETAL: No joint pain or swelling; No muscle, back, or extremity pain  PSYCHIATRIC: No depression, anxiety, mood swings, or difficulty sleeping  HEME/LYMPH: No easy bruising, or bleeding gums  ALLERY AND IMMUNOLOGIC: No hives or eczema	    [ ] All others negative	  [ ] Unable to obtain    PHYSICAL EXAM:  T(C): 36.8 (11-12-24 @ 05:02), Max: 37 (11-11-24 @ 20:07)  HR: 101 (11-12-24 @ 05:02) (84 - 101)  BP: 131/81 (11-12-24 @ 05:02) (111/72 - 137/80)  RR: 17 (11-12-24 @ 05:02) (15 - 20)  SpO2: 97% (11-12-24 @ 05:02) (97% - 98%)  Wt(kg): --  I&O's Summary      Appearance: Normal	  HEENT:   Normal oral mucosa, PERRL, EOMI	  Lymphatic: No lymphadenopathy  Cardiovascular: Normal S1 S2, No JVD, No murmurs, No edema  Respiratory: Lungs clear to auscultation	  Psychiatry: A & O x 3, Mood & affect appropriate  Gastrointestinal:  Soft, Non-tender, + BS	  Skin: No rashes, No ecchymoses, No cyanosis	  Neurologic: Non-focal  Extremities: Normal range of motion, No clubbing, cyanosis or edema  Vascular: Peripheral pulses palpable 2+ bilaterally      LABS:	 	    CBC Full  -  ( 11 Nov 2024 12:10 )  WBC Count : 11.26 K/uL  Hemoglobin : 12.9 g/dL  Hematocrit : 39.9 %  Platelet Count - Automated : 201 K/uL  Mean Cell Volume : 78.4 fL  Mean Cell Hemoglobin : 25.3 pg  Mean Cell Hemoglobin Concentration : 32.3 g/dL  Auto Neutrophil # : 4.82 K/uL  Auto Lymphocyte # : 4.98 K/uL  Auto Monocyte # : 0.95 K/uL  Auto Eosinophil # : 0.39 K/uL  Auto Basophil # : 0.06 K/uL  Auto Neutrophil % : 42.9 %  Auto Lymphocyte % : 44.2 %  Auto Monocyte % : 8.4 %  Auto Eosinophil % : 3.5 %  Auto Basophil % : 0.5 %    11-11    138  |  102  |  12  ----------------------------<  186[H]  3.6   |  22  |  0.69    Ca    8.6      11 Nov 2024 12:10    TPro  7.2  /  Alb  3.7  /  TBili  0.2  /  DBili  x   /  AST  34[H]  /  ALT  40[H]  /  AlkPhos  80  11-11      proBNP:   Lipid Profile:   HgA1c:   TSH:       CARDIAC MARKERS:            TELEMETRY: 	    ECG:   	  RADIOLOGY:  OTHER: 	      PREVIOUS DIAGNOSTIC CARDIOVASCULAR TESTING:      [ ]  Echocardiogram:  [ ]  Catheterization:  [ ]  Stress test:    [ ]  Vascular studies:  	  	     Cardiology/Vascular Medicine Inpatient Consultation Note    HISTORY OF PRESENT ILLNESS:    Patient is a 54-year-old woman with hypertension and DM2.  She presented to the [facility] ED with complaints of progressively worsening headache and chest pain over the past 2 weeks.  No other associated cardiac complaints.  Denies recent illnesses.  No recent cardiac assessment.    In the ED, the patient was clinically and hemodynamically stable.  Cardiac troponins flat.  CXR showed clear lungs.  12-lead ECG notes NSR, normal axis, normal intervals.    CTA head/neck unremarkable for acute findings    Physical examination was unremarkable.  Appears euvolemic on exam.    Echocardiogram demonstrated normal biventricular systolic function.  Nuclear stress test did not demonstrate the presence of ischemia.    Impression:   Atypical chest pain     Recommendations:  No further inpatient cardiac testing is needed at this time.  From the cardiac perspective, the patient may be discharged to home with instructions to follow up in our office [phone]) within one month for further evaluation as needed.    Allergies  iodinated radiocontrast agents (Unknown)    MEDICATIONS:  amLODIPine   Tablet 5 milliGRAM(s) Oral daily  losartan 50 milliGRAM(s) Oral daily  montelukast 10 milliGRAM(s) Oral daily  escitalopram 20 milliGRAM(s) Oral daily  levothyroxine 25 MICROGram(s) Oral daily  ketotifen 0.025% Ophthalmic Solution 1 Drop(s) Both EYES two times a day    PAST MEDICAL & SURGICAL HISTORY:  DM (diabetes mellitus)  HTN (hypertension) S/P tonsillectomy    FAMILY HISTORY:  As above, as per chart notes    SOCIAL HISTORY:    As above, as per chart notes    REVIEW OF SYSTEMS:  As above, as per chart notes    PHYSICAL EXAM:  T(C): 36.8 (11-12-24 @ 05:02), Max: 37 (11-11-24 @ 20:07)  HR: 101 (11-12-24 @ 05:02) (84 - 101)  BP: 131/81 (11-12-24 @ 05:02) (111/72 - 137/80)  RR: 17 (11-12-24 @ 05:02) (15 - 20)  SpO2: 97% (11-12-24 @ 05:02) (97% - 98%)    Appearance: NAD  HEENT:   No apparent JVD  Cardiovascular: Normal S1 S2  Respiratory: Decreased breath sounds bilaterally  Psychiatry: Awake, alert  Neurologic: Non-focal  Extremities: No LE edema    LABS:	 	    CBC Full  -  ( 11 Nov 2024 12:10 )  WBC Count : 11.26 K/uL  Hemoglobin : 12.9 g/dL  Hematocrit : 39.9 %  Platelet Count - Automated : 201 K/uL  Mean Cell Volume : 78.4 fL  Mean Cell Hemoglobin : 25.3 pg  Mean Cell Hemoglobin Concentration : 32.3 g/dL  Auto Neutrophil # : 4.82 K/uL  Auto Lymphocyte # : 4.98 K/uL  Auto Monocyte # : 0.95 K/uL  Auto Eosinophil # : 0.39 K/uL  Auto Basophil # : 0.06 K/uL  Auto Neutrophil % : 42.9 %  Auto Lymphocyte % : 44.2 %  Auto Monocyte % : 8.4 %  Auto Eosinophil % : 3.5 %  Auto Basophil % : 0.5 %    11-11    138  |  102  |  12  ----------------------------<  186[H]  3.6   |  22  |  0.69    Ca    8.6      11 Nov 2024 12:10    TPro  7.2  /  Alb  3.7  /  TBili  0.2  /  DBili  x   /  AST  34[H]  /  ALT  40[H]  /  AlkPhos  80  11-11    < from: Xray Chest 1 View- PORTABLE-Urgent (11.11.24 @ 13:09) >    ACC: [id number] EXAM:  XR CHEST PORTABLE URGENT 1V   ORDERED BY: PRESTON PRATHER     PROCEDURE DATE:  11/11/2024          INTERPRETATION:  CLINICAL INDICATION: Chest pain    EXAM: Frontal radiograph of the chest.    COMPARISON: Chest radiograph from 9/30/2024    FINDINGS:  The lungs are clear.  There is no pleural effusion or pneumothorax.  The heart is normal in size  The visualized osseous structures demonstrate no acute pathology.    IMPRESSION:  Clear lungs.    --- End of Report ---    < end of copied text >  < from: CT Angio Head w/ IV Cont (11.11.24 @ 18:21) >  ACC: [id number] EXAM:  CT ANGIO NECK (W)AW IC   ORDERED BY: PRESTON PRATHER     ACC: [id number] EXAM:  CT ANGIO BRAIN (W)AW IC   ORDERED BY: PRESTON PRATHER     PROCEDURE DATE:  11/11/2024          INTERPRETATION:  CLINICAL INFORMATION: headache    COMPARISON: CT head 12/12/2023.    CONTRAST:  IV Contrast: Omnipaque 350  90 cc administered  10 cc discarded  Complications: None reported at time of study completion    TECHNIQUE:  CT BRAIN: Serial axial images were obtained from the skull base to the   vertex without the use of contrast.    CTA NECK/HEAD: After the intravenous power injection of non-ionic   contrast material, serial thin sections were obtained through the   cervical and intracranial circulation on a multislice CT scanner. Axial,   Coronal and Sagittal MIP reformatted images were obtained. 3D   reconstruction was also performed.    FINDINGS:    CT BRAIN:    VENTRICLES AND SULCI: Normal in size and configuration.  INTRA-AXIAL: No mass effect, acute hemorrhage, or midline shift.  EXTRA-AXIAL: No mass or fluid collection. Basal cisterns are normal in   appearance.    VISUALIZED SINUSES:  Opacification of the right frontal sinus. Moderate   mucosal thickening right maxillary sinus. Mild mucosal thickening left   sphenoid sinus..  TYMPANOMASTOID CAVITIES:  Clear.  VISUALIZED ORBITS: Normal.  CALVARIUM: Intact.    MISCELLANEOUS: None.      CTA NECK:    AORTIC ARCH AND VISUALIZED GREAT VESSELS: Within normal limits.    RIGHT:  COMMON CAROTID ARTERY: No significant stenosis to the carotid bifurcation.  INTERNAL CAROTID ARTERY: No significant stenosis based on NASCET criteria.  VERTEBRAL ARTERY: Normal in course and caliber to the intracranial   circulation.    LEFT:  COMMON CAROTID ARTERY: No significant stenosis to the carotid bifurcation.  INTERNAL CAROTID ARTERY: No significant stenosis based on NASCET criteria.  VERTEBRAL ARTERY: Normal in course and caliber to the intracranial   circulation.    VISUALIZED LUNGS: Mosaic attenuation of bilateral lung parenchyma, likely   breathing artifact versus air trapping.    MISCELLANEOUS: None.    CAROTID STENOSIS REFERENCE: Percent (%) stenosis is expressed in terms of   NASCET Criteria. (NASCET = 100x1-(N/D)). N=greatest narrowing. D=normal   distal diameter - MILD = <50% stenosis. - MODERATE = 50-69% stenosis. -   SEVERE = 70-89% stenosis. - HAIRLINE/CRITICAL = 90-99% stenosis. -   OCCLUDED = 100% stenosis.      CTA HEAD:    INTERNAL CAROTID ARTERIES: Bilateral petrous, precavernous, cavernous,   and supraclinoid regions are patent without significant stenosis.    [place]: No aneurysm identified. Tiny aneurysms can be beyond   the resolution of CTA technique.    ANTERIOR CEREBRAL ARTERIES: No significant stenosis or occlusion.  MIDDLE CEREBRAL ARTERIES: No significant stenosis or occlusion.  POSTERIOR CEREBRAL ARTERIES: No significant stenosis or occlusion.    DISTAL VERTEBRAL / BASILAR ARTERIES: Dominant left V4. No significant   stenosis or occlusion.    VENOUS STRUCTURES: Visualized superficial and deepvenous structures   appear patent.    MISCELLANEOUS: No other vascular abnormality is seen.      IMPRESSION:    CT HEAD:  No acute intracranial hemorrhage, mass effect, or midline shift.  Multilevel sinus opacification and mucosal thickening, suspicious for   sinusitis.    CTA NECK:  No evidence of significant stenosis or occlusion.    CTA HEAD:  No large vessel occlusion, significant stenosis or vascular abnormality   identified.    --- End of Report ---          MARY KAM MD; Resident Radiologist  This document has been electronically signed.  AVNI LOPEZ MD; Attending Radiologist  This document has been electronically signed. Nov 11 2024  6:58PM    < end of copied text >  < from: TTE W or WO Ultrasound Enhancing Agent (11.12.24 @ 07:19) >  TRANSTHORACIC ECHOCARDIOGRAM REPORT  ________________________________________________________________________________                                      _______       Pt. Name:       ARMAAN GEORGE Study Date:    11/12/2024  MRN:            [id number]      YOB: 1970  Accession #:    [id number]      Age:           54 years  Account#:       [id number]       Gender:        F  Heart Rate:                    Height:        62.00 in (157.48 cm)  Rhythm:                        Weight:   170.00 lb (77.11 kg)  Blood Pressure: 131/81 mmHg    BSA/BMI:       1.78 m² / 31.09 kg/m²  ________________________________________________________________________________________  Referring Physician:    Rey Arambula MD  Interpreting Physician: Ricardo Zelaya MD  Primary Sonographer:    Elida Fitzpatrick [facility]    CPT:               ECHO TTE WO CON COMP W DOPP - 63863.m  Indication(s):     Angina pectoris, unspecified - I20.9  Procedure:         Transthoracic echocardiogram with 2-D, M-mode and complete                     spectral and color flow Doppler.  Ordering Location: [facility]  Admission Status:  Inpatient  Study Information: Image quality for this study is technically difficult.    _______________________________________________________________________________________     CONCLUSIONS:      1. Left ventricular cavity is small. Left ventricular wall thickness is normal. Left ventricular systolic function is normal with an ejection fraction of 58 % by Maier's method of disks. There are no regional wall motion abnormalities seen.   2. Normal left ventricular diastolic function, with normal left ventricular filling pressure.   3. Normal right ventricular cavity size, with normal wall thickness, and normal right ventricular systolic function.   4. Normal left and right atrial size.   5. No significant valvular disease.   6. No pericardial effusion seen.   7. Technically difficult image quality.   8. The inferior vena cava is normal in size (normal <2.1cm) with normal inspiratory collapse (normal >50%) consistent with normal right atrial pressure (~3, range 0-5mmHg).      < end of copied text >  < from: Nuclear Stress Test-Pharmacologic.. (11.12.24 @ 07:24) >    Nuclear Pharmacologic Stress Test Report         Patient: ARMAAN GEORGE           Study Date: 11/12/2024           MRN: [id number]            Birth Date/Age: 1970 Age: 54 years      Access #: [id number]                    Gender: F     Order Loc: [facility]                         Height: 157.5 cm/ 62.0 in  Request Phys: ED CDU                       Weight: 77.11 kg/ 170.00 lb     Procedure: Stress Pharmacologic       BSA/ BMI: 1.78 m²/ 31.09 kg/m²    Indication: Chest Pain - R07.9    Admiss Status: ER    Fellow/ACP: Sendy HOLLY         Fellow/ACP: Dhiraj Fagan    ---------------------------------------------------------------------------------------------------------------------------------------------------------  Procedure Code: STRESS TEST TRACING ONLY - 84070.m;INJECTION REGADENOSON -                  .m;MYOCARDIAL SPECT SINGLE - 22246.m;TC 99M SESTAMIBI PER                  DOSE 2nd - .m    ---------------------------------------------------------------------------------------------------------------------------------------------------------  History:       54 year old female with past medical history of hypertension and                 diabetes mellitus presents with chest pain and headache x2 weeks,   exercise tolerance limited secondary to shortness of breath and                 difficulty navigating treadmill.  Risk Factors:  Hypertension, diabetes, family history of coronary artery disease                 and Postmenopausal.  Image Quality: Excellent  Medications:   Norvasc, Lexapro, Synthroid, Losartan, Singulair.  Allergies:     Contrast    --------------------------------------------------------------------------------------------------------------------------------------------------------Conclusions:   1. Normal myocardial perfusion scan, with no evidence of infarction or inducible ischemia.   2. Normal left ventricular regional wall motion.   3. The left ventricle is normal in function and small in size. The time activity curve suggests diastolic dysfunction.   4. LVEF >75%; LVEDV 45 mL; LVESV 6 mL.   5. Normal right ventricular function. There is no right ventricular dilation. RVEF >75%; RVEDV 40 mL; RVESV 5 mL.      < end of copied text >

## 2024-11-12 NOTE — ED CDU PROVIDER DISPOSITION NOTE - DISPOSITION TYPE
[address] [facility] [id number]    NEUROLOGY CLINIC NOTE    CC:   Chief Complaint   Patient presents with    Follow-up    Seizures     HPI:   Katerine Hood is a 57 year old female with RhA on leflunomide, HTN, tobacco use, pre-DM, double mastectomy who follows up for seizures. See prior notes for full details.     Interval History:   -On Keppra 500mg BID.   -No seizure since last visit  -Notes brain fog  -Surgery at the end of this month or start of next month. Removing spacers and putting in breast implants  -Saw OT once. Didn't go back. Needs to reschedule.       PMH, PSH, FAMHX, SOCHX, Medications, & Allergies were reviewed    PHYSICAL EXAMINATION:   BP (!) 150/103 (BP Location: LUE - Left upper extremity, Patient Position: Sitting, Cuff Size: Regular)   Pulse 62   Resp 20   Ht 5' 5\" (1.651 m)   Wt 82.4 kg (181 lb 10.5 oz)   LMP  (LMP Unknown)   BMI 30.23 kg/m²   BSA 1.9 m²     Alert and attentive. Intact higher cognitive function.   No aphasia or dysarthria  EOMI.  Face symmetric.  Tongue midline.  Symmetrical limb movement. No abnormal movements appreciated.   No dysmetria  Normal gait    ASSESSMENT & PLAN:   Katerine Hood is a 57 year old female with RhA on leflunomide, HTN, tobacco use, pre-DM, breast cancer s/p mastectomy with two seizures: one possibly provoked by severe sleep deprivation and another recently unprovoked seizure. Likely underlying epilepsy. Plan as follows:     -Continue Keppra 500mg BID.   -Cleared for surgery from neurology standpoint. Should take AM dose of Keppra (Levetiracetam). Can be given intravenously at equivalent dose.   -Continue OT for RhA. Needs to get scheduled.   -Seizure precautions advised    Return to clinic 9 months    Chris Sr MD  Neurology   DISCHARGE

## 2024-11-12 NOTE — ED CDU PROVIDER DISPOSITION NOTE - PATIENT PORTAL LINK FT
You can access the FollowMyHealth Patient Portal offered by [facility] by registering at the following website: [email or web address]. By joining [facility]’s FollowMyHealth portal, you will also be able to view your health information using other applications (apps) compatible with our system.

## 2024-11-12 NOTE — ED CDU PROVIDER DISPOSITION NOTE - CLINICAL COURSE
54-year-old female past medical history of hypertension and diabetes not on insulin is presenting to the emergency department with headache and chest pain.  Headache is 8 out of 10 as an radiation to her shoulders.  Pain starts gradually and has been gradually worsening.  Took ibuprofen for the pain but it did not help.  No history of fevers.  Has been going on for 2 days.  Her chest pain is central chest pain, nonexertional, pressure-like.  Has not seen a cardiologist.  Chest pain has going on for about 2 weeks.  No shortness of breath. Patient underwent EKG which was normal sinus rhythm without ST elevations or depressions.  Labs within normal limits, nonactionable.  Troponin <6.  CTA of head and neck negative for acute intracranial pathology.  Chest x-ray clear.  Patient placed in CDU for stress, echo, telemetry monitoring, cardiac consult.   Echo and Stress negative. Stable for DC with Dr Arambula (cardiology).

## 2024-11-12 NOTE — ED CDU PROVIDER SUBSEQUENT DAY NOTE - HISTORY
54-year-old female past medical history of hypertension and diabetes not on insulin is presenting to the emergency department with headache and chest pain.  Headache is 8 out of 10 as an radiation to her shoulders.  Pain starts gradually and has been gradually worsening.  Took ibuprofen for the pain but it did not help.  No history of fevers.  Has been going on for 2 days.  Her chest pain is central chest pain, nonexertional, pressure-like.  Has not seen a cardiologist.  Chest pain has going on for about 2 weeks.  No shortness of breath. Patient underwent EKG which was normal sinus rhythm without ST elevations or depressions.  Labs within normal limits, nonactionable.  Troponin <6.  CTA of head and neck negative for acute intracranial pathology.  Chest x-ray clear.  Patient placed in CDU for stress, echo, telemetry monitoring, cardiac consult.    Interval hx - VSS, no events on tele overnight.